# Patient Record
Sex: MALE | Race: BLACK OR AFRICAN AMERICAN | NOT HISPANIC OR LATINO | Employment: UNEMPLOYED | URBAN - METROPOLITAN AREA
[De-identification: names, ages, dates, MRNs, and addresses within clinical notes are randomized per-mention and may not be internally consistent; named-entity substitution may affect disease eponyms.]

---

## 2023-07-06 ENCOUNTER — HOSPITAL ENCOUNTER (INPATIENT)
Facility: HOSPITAL | Age: 68
LOS: 5 days | Discharge: HOME | DRG: 093 | End: 2023-07-11
Attending: EMERGENCY MEDICINE | Admitting: INTERNAL MEDICINE
Payer: MEDICARE

## 2023-07-06 ENCOUNTER — APPOINTMENT (EMERGENCY)
Dept: RADIOLOGY | Facility: HOSPITAL | Age: 68
DRG: 093 | End: 2023-07-06
Payer: MEDICARE

## 2023-07-06 ENCOUNTER — APPOINTMENT (EMERGENCY)
Dept: CT IMAGING | Facility: HOSPITAL | Age: 68
DRG: 093 | End: 2023-07-06
Attending: EMERGENCY MEDICINE
Payer: MEDICARE

## 2023-07-06 ENCOUNTER — APPOINTMENT (EMERGENCY)
Dept: CT IMAGING | Facility: HOSPITAL | Age: 68
DRG: 093 | End: 2023-07-06
Payer: MEDICARE

## 2023-07-06 DIAGNOSIS — G93.40 ENCEPHALOPATHY: ICD-10-CM

## 2023-07-06 DIAGNOSIS — I95.9 HYPOTENSIVE EPISODE: ICD-10-CM

## 2023-07-06 DIAGNOSIS — G93.40 ACUTE ENCEPHALOPATHY: ICD-10-CM

## 2023-07-06 DIAGNOSIS — R47.81 SLURRED SPEECH: Primary | ICD-10-CM

## 2023-07-06 DIAGNOSIS — F25.0 SCHIZOAFFECTIVE DISORDER, BIPOLAR TYPE (HCC): ICD-10-CM

## 2023-07-06 PROBLEM — E78.5 HYPERLIPIDEMIA: Status: ACTIVE | Noted: 2023-07-06

## 2023-07-06 PROBLEM — K21.9 GERD (GASTROESOPHAGEAL REFLUX DISEASE): Status: ACTIVE | Noted: 2023-07-06

## 2023-07-06 PROBLEM — F42.9 OBSESSIVE COMPULSIVE DISORDER: Status: ACTIVE | Noted: 2023-07-06

## 2023-07-06 PROBLEM — R56.9 SEIZURE (HCC): Status: ACTIVE | Noted: 2023-07-06

## 2023-07-06 PROBLEM — I10 HYPERTENSION: Status: ACTIVE | Noted: 2023-07-06

## 2023-07-06 LAB
2HR DELTA HS TROPONIN: -1 NG/L
ALBUMIN SERPL BCP-MCNC: 3.4 G/DL (ref 3.5–5)
ALP SERPL-CCNC: 44 U/L (ref 34–104)
ALT SERPL W P-5'-P-CCNC: 26 U/L (ref 7–52)
AMPHETAMINES SERPL QL SCN: NEGATIVE
ANION GAP SERPL CALCULATED.3IONS-SCNC: 7 MMOL/L
APAP SERPL-MCNC: <10 UG/ML (ref 10–20)
APTT PPP: 26 SECONDS (ref 23–37)
AST SERPL W P-5'-P-CCNC: 14 U/L (ref 13–39)
ATRIAL RATE: 70 BPM
BARBITURATES UR QL: NEGATIVE
BASE EXCESS BLDA CALC-SCNC: 2 MMOL/L (ref -2–3)
BASOPHILS # BLD AUTO: 0.02 THOUSANDS/ÂΜL (ref 0–0.1)
BASOPHILS NFR BLD AUTO: 0 % (ref 0–1)
BENZODIAZ UR QL: NEGATIVE
BILIRUB SERPL-MCNC: 0.46 MG/DL (ref 0.2–1)
BUN SERPL-MCNC: 24 MG/DL (ref 5–25)
CA-I BLD-SCNC: 1.14 MMOL/L (ref 1.12–1.32)
CALCIUM ALBUM COR SERPL-MCNC: 9 MG/DL (ref 8.3–10.1)
CALCIUM SERPL-MCNC: 8.5 MG/DL (ref 8.4–10.2)
CARDIAC TROPONIN I PNL SERPL HS: 4 NG/L
CARDIAC TROPONIN I PNL SERPL HS: 5 NG/L
CHLORIDE SERPL-SCNC: 104 MMOL/L (ref 96–108)
CO2 SERPL-SCNC: 26 MMOL/L (ref 21–32)
COCAINE UR QL: NEGATIVE
CREAT SERPL-MCNC: 1.15 MG/DL (ref 0.6–1.3)
EOSINOPHIL # BLD AUTO: 0.09 THOUSAND/ÂΜL (ref 0–0.61)
EOSINOPHIL NFR BLD AUTO: 1 % (ref 0–6)
ERYTHROCYTE [DISTWIDTH] IN BLOOD BY AUTOMATED COUNT: 13.5 % (ref 11.6–15.1)
ERYTHROCYTE [DISTWIDTH] IN BLOOD BY AUTOMATED COUNT: 13.7 % (ref 11.6–15.1)
ETHANOL SERPL-MCNC: <10 MG/DL
FLUAV RNA RESP QL NAA+PROBE: NEGATIVE
FLUBV RNA RESP QL NAA+PROBE: NEGATIVE
GFR SERPL CREATININE-BSD FRML MDRD: 64 ML/MIN/1.73SQ M
GLUCOSE SERPL-MCNC: 85 MG/DL (ref 65–140)
GLUCOSE SERPL-MCNC: 89 MG/DL (ref 65–140)
GLUCOSE SERPL-MCNC: 90 MG/DL (ref 65–140)
HCO3 BLDA-SCNC: 26.5 MMOL/L (ref 24–30)
HCT VFR BLD AUTO: 43.5 % (ref 36.5–49.3)
HCT VFR BLD AUTO: 43.7 % (ref 36.5–49.3)
HCT VFR BLD CALC: 40 % (ref 36.5–49.3)
HGB BLD-MCNC: 14.1 G/DL (ref 12–17)
HGB BLD-MCNC: 14.1 G/DL (ref 12–17)
HGB BLDA-MCNC: 13.6 G/DL (ref 12–17)
IMM GRANULOCYTES # BLD AUTO: 0.03 THOUSAND/UL (ref 0–0.2)
IMM GRANULOCYTES NFR BLD AUTO: 0 % (ref 0–2)
INR PPP: 0.94 (ref 0.84–1.19)
LACTATE SERPL-SCNC: 1.5 MMOL/L (ref 0.5–2)
LITHIUM SERPL-SCNC: 0.3 MMOL/L (ref 0.6–1.2)
LYMPHOCYTES # BLD AUTO: 0.9 THOUSANDS/ÂΜL (ref 0.6–4.47)
LYMPHOCYTES NFR BLD AUTO: 10 % (ref 14–44)
MCH RBC QN AUTO: 30.9 PG (ref 26.8–34.3)
MCH RBC QN AUTO: 31.3 PG (ref 26.8–34.3)
MCHC RBC AUTO-ENTMCNC: 32.3 G/DL (ref 31.4–37.4)
MCHC RBC AUTO-ENTMCNC: 32.4 G/DL (ref 31.4–37.4)
MCV RBC AUTO: 96 FL (ref 82–98)
MCV RBC AUTO: 97 FL (ref 82–98)
METHADONE UR QL: NEGATIVE
MONOCYTES # BLD AUTO: 0.69 THOUSAND/ÂΜL (ref 0.17–1.22)
MONOCYTES NFR BLD AUTO: 7 % (ref 4–12)
NEUTROPHILS # BLD AUTO: 7.61 THOUSANDS/ÂΜL (ref 1.85–7.62)
NEUTS SEG NFR BLD AUTO: 82 % (ref 43–75)
NRBC BLD AUTO-RTO: 0 /100 WBCS
OPIATES UR QL SCN: NEGATIVE
OXYCODONE+OXYMORPHONE UR QL SCN: NEGATIVE
P AXIS: 82 DEGREES
PCO2 BLD: 28 MMOL/L (ref 21–32)
PCO2 BLD: 42.2 MM HG (ref 42–50)
PCP UR QL: NEGATIVE
PH BLD: 7.41 [PH] (ref 7.3–7.4)
PLATELET # BLD AUTO: 164 THOUSANDS/UL (ref 149–390)
PLATELET # BLD AUTO: 195 THOUSANDS/UL (ref 149–390)
PMV BLD AUTO: 10 FL (ref 8.9–12.7)
PMV BLD AUTO: 9.5 FL (ref 8.9–12.7)
PO2 BLD: 62 MM HG (ref 35–45)
POTASSIUM BLD-SCNC: 3.7 MMOL/L (ref 3.5–5.3)
POTASSIUM SERPL-SCNC: 3.7 MMOL/L (ref 3.5–5.3)
PR INTERVAL: 164 MS
PROCALCITONIN SERPL-MCNC: 0.1 NG/ML
PROT SERPL-MCNC: 6.3 G/DL (ref 6.4–8.4)
PROTHROMBIN TIME: 13.2 SECONDS (ref 11.6–14.5)
QRS AXIS: 71 DEGREES
QRSD INTERVAL: 90 MS
QT INTERVAL: 390 MS
QTC INTERVAL: 421 MS
RBC # BLD AUTO: 4.5 MILLION/UL (ref 3.88–5.62)
RBC # BLD AUTO: 4.56 MILLION/UL (ref 3.88–5.62)
RSV RNA RESP QL NAA+PROBE: NEGATIVE
SALICYLATES SERPL-MCNC: <5 MG/DL (ref 3–20)
SAO2 % BLD FROM PO2: 91 % (ref 60–85)
SARS-COV-2 RNA RESP QL NAA+PROBE: NEGATIVE
SODIUM BLD-SCNC: 138 MMOL/L (ref 136–145)
SODIUM SERPL-SCNC: 137 MMOL/L (ref 135–147)
SPECIMEN SOURCE: ABNORMAL
T WAVE AXIS: 56 DEGREES
THC UR QL: NEGATIVE
TSH SERPL DL<=0.05 MIU/L-ACNC: 0.41 UIU/ML (ref 0.45–4.5)
VENTRICULAR RATE: 70 BPM
WBC # BLD AUTO: 10.08 THOUSAND/UL (ref 4.31–10.16)
WBC # BLD AUTO: 9.34 THOUSAND/UL (ref 4.31–10.16)

## 2023-07-06 PROCEDURE — 85027 COMPLETE CBC AUTOMATED: CPT | Performed by: EMERGENCY MEDICINE

## 2023-07-06 PROCEDURE — 82948 REAGENT STRIP/BLOOD GLUCOSE: CPT

## 2023-07-06 PROCEDURE — 82330 ASSAY OF CALCIUM: CPT

## 2023-07-06 PROCEDURE — 85014 HEMATOCRIT: CPT

## 2023-07-06 PROCEDURE — 84132 ASSAY OF SERUM POTASSIUM: CPT

## 2023-07-06 PROCEDURE — 96361 HYDRATE IV INFUSION ADD-ON: CPT

## 2023-07-06 PROCEDURE — 80178 ASSAY OF LITHIUM: CPT | Performed by: EMERGENCY MEDICINE

## 2023-07-06 PROCEDURE — 82803 BLOOD GASES ANY COMBINATION: CPT

## 2023-07-06 PROCEDURE — 84484 ASSAY OF TROPONIN QUANT: CPT | Performed by: EMERGENCY MEDICINE

## 2023-07-06 PROCEDURE — 80307 DRUG TEST PRSMV CHEM ANLYZR: CPT | Performed by: EMERGENCY MEDICINE

## 2023-07-06 PROCEDURE — 80143 DRUG ASSAY ACETAMINOPHEN: CPT | Performed by: EMERGENCY MEDICINE

## 2023-07-06 PROCEDURE — 96372 THER/PROPH/DIAG INJ SC/IM: CPT

## 2023-07-06 PROCEDURE — 93010 ELECTROCARDIOGRAM REPORT: CPT | Performed by: INTERNAL MEDICINE

## 2023-07-06 PROCEDURE — 84145 PROCALCITONIN (PCT): CPT | Performed by: EMERGENCY MEDICINE

## 2023-07-06 PROCEDURE — 85730 THROMBOPLASTIN TIME PARTIAL: CPT | Performed by: EMERGENCY MEDICINE

## 2023-07-06 PROCEDURE — 36415 COLL VENOUS BLD VENIPUNCTURE: CPT | Performed by: EMERGENCY MEDICINE

## 2023-07-06 PROCEDURE — 82947 ASSAY GLUCOSE BLOOD QUANT: CPT

## 2023-07-06 PROCEDURE — 0241U HB NFCT DS VIR RESP RNA 4 TRGT: CPT | Performed by: EMERGENCY MEDICINE

## 2023-07-06 PROCEDURE — 93005 ELECTROCARDIOGRAM TRACING: CPT

## 2023-07-06 PROCEDURE — 99291 CRITICAL CARE FIRST HOUR: CPT | Performed by: EMERGENCY MEDICINE

## 2023-07-06 PROCEDURE — 99285 EMERGENCY DEPT VISIT HI MDM: CPT

## 2023-07-06 PROCEDURE — 82607 VITAMIN B-12: CPT | Performed by: PHYSICIAN ASSISTANT

## 2023-07-06 PROCEDURE — 70496 CT ANGIOGRAPHY HEAD: CPT

## 2023-07-06 PROCEDURE — 71045 X-RAY EXAM CHEST 1 VIEW: CPT

## 2023-07-06 PROCEDURE — 80179 DRUG ASSAY SALICYLATE: CPT | Performed by: EMERGENCY MEDICINE

## 2023-07-06 PROCEDURE — 85610 PROTHROMBIN TIME: CPT | Performed by: EMERGENCY MEDICINE

## 2023-07-06 PROCEDURE — 84439 ASSAY OF FREE THYROXINE: CPT | Performed by: PHYSICIAN ASSISTANT

## 2023-07-06 PROCEDURE — 82077 ASSAY SPEC XCP UR&BREATH IA: CPT | Performed by: EMERGENCY MEDICINE

## 2023-07-06 PROCEDURE — 99223 1ST HOSP IP/OBS HIGH 75: CPT | Performed by: INTERNAL MEDICINE

## 2023-07-06 PROCEDURE — 80053 COMPREHEN METABOLIC PANEL: CPT | Performed by: EMERGENCY MEDICINE

## 2023-07-06 PROCEDURE — 87040 BLOOD CULTURE FOR BACTERIA: CPT | Performed by: EMERGENCY MEDICINE

## 2023-07-06 PROCEDURE — 96375 TX/PRO/DX INJ NEW DRUG ADDON: CPT

## 2023-07-06 PROCEDURE — 85025 COMPLETE CBC W/AUTO DIFF WBC: CPT | Performed by: PHYSICIAN ASSISTANT

## 2023-07-06 PROCEDURE — 83605 ASSAY OF LACTIC ACID: CPT | Performed by: EMERGENCY MEDICINE

## 2023-07-06 PROCEDURE — 84295 ASSAY OF SERUM SODIUM: CPT

## 2023-07-06 PROCEDURE — 96365 THER/PROPH/DIAG IV INF INIT: CPT

## 2023-07-06 PROCEDURE — 84443 ASSAY THYROID STIM HORMONE: CPT | Performed by: PHYSICIAN ASSISTANT

## 2023-07-06 PROCEDURE — 70498 CT ANGIOGRAPHY NECK: CPT

## 2023-07-06 RX ORDER — CHLORTHALIDONE 25 MG/1
25 TABLET ORAL DAILY
COMMUNITY

## 2023-07-06 RX ORDER — BENZTROPINE MESYLATE 1 MG/1
1 TABLET ORAL DAILY
Status: DISCONTINUED | OUTPATIENT
Start: 2023-07-07 | End: 2023-07-07

## 2023-07-06 RX ORDER — SODIUM CHLORIDE 9 MG/ML
75 INJECTION, SOLUTION INTRAVENOUS CONTINUOUS
Status: DISCONTINUED | OUTPATIENT
Start: 2023-07-06 | End: 2023-07-09

## 2023-07-06 RX ORDER — ATORVASTATIN CALCIUM 40 MG/1
40 TABLET, FILM COATED ORAL
Status: DISCONTINUED | OUTPATIENT
Start: 2023-07-06 | End: 2023-07-11 | Stop reason: HOSPADM

## 2023-07-06 RX ORDER — NALOXONE HYDROCHLORIDE 1 MG/ML
1 INJECTION PARENTERAL ONCE
Status: COMPLETED | OUTPATIENT
Start: 2023-07-06 | End: 2023-07-06

## 2023-07-06 RX ORDER — LITHIUM CARBONATE 300 MG/1
900 TABLET, FILM COATED, EXTENDED RELEASE ORAL
Status: DISCONTINUED | OUTPATIENT
Start: 2023-07-06 | End: 2023-07-11 | Stop reason: HOSPADM

## 2023-07-06 RX ORDER — CLOZAPINE 200 MG/1
200 TABLET ORAL
COMMUNITY
End: 2023-07-11

## 2023-07-06 RX ORDER — HYDRALAZINE HYDROCHLORIDE 20 MG/ML
5 INJECTION INTRAMUSCULAR; INTRAVENOUS EVERY 6 HOURS PRN
Status: DISCONTINUED | OUTPATIENT
Start: 2023-07-06 | End: 2023-07-11 | Stop reason: HOSPADM

## 2023-07-06 RX ORDER — SODIUM CHLORIDE, SODIUM GLUCONATE, SODIUM ACETATE, POTASSIUM CHLORIDE, MAGNESIUM CHLORIDE, SODIUM PHOSPHATE, DIBASIC, AND POTASSIUM PHOSPHATE .53; .5; .37; .037; .03; .012; .00082 G/100ML; G/100ML; G/100ML; G/100ML; G/100ML; G/100ML; G/100ML
1000 INJECTION, SOLUTION INTRAVENOUS ONCE
Status: COMPLETED | OUTPATIENT
Start: 2023-07-06 | End: 2023-07-06

## 2023-07-06 RX ORDER — CLOZAPINE 100 MG/1
200 TABLET ORAL
Status: DISCONTINUED | OUTPATIENT
Start: 2023-07-06 | End: 2023-07-07

## 2023-07-06 RX ORDER — ARIPIPRAZOLE 400 MG
400 KIT INTRAMUSCULAR
COMMUNITY
End: 2023-07-11

## 2023-07-06 RX ORDER — HALOPERIDOL 5 MG/ML
INJECTION INTRAMUSCULAR
Status: COMPLETED
Start: 2023-07-06 | End: 2023-07-06

## 2023-07-06 RX ORDER — LORAZEPAM 2 MG/ML
1 INJECTION INTRAMUSCULAR ONCE
Status: COMPLETED | OUTPATIENT
Start: 2023-07-06 | End: 2023-07-06

## 2023-07-06 RX ORDER — FLUVOXAMINE MALEATE 100 MG
300 TABLET ORAL
COMMUNITY
End: 2023-07-11

## 2023-07-06 RX ORDER — BENZTROPINE MESYLATE 1 MG/1
1 TABLET ORAL EVERY 8 HOURS PRN
Status: DISCONTINUED | OUTPATIENT
Start: 2023-07-06 | End: 2023-07-11 | Stop reason: HOSPADM

## 2023-07-06 RX ORDER — CARVEDILOL 12.5 MG/1
25 TABLET ORAL 2 TIMES DAILY WITH MEALS
Status: DISCONTINUED | OUTPATIENT
Start: 2023-07-06 | End: 2023-07-11 | Stop reason: HOSPADM

## 2023-07-06 RX ORDER — CITALOPRAM 20 MG/1
20 TABLET ORAL DAILY
Status: DISCONTINUED | OUTPATIENT
Start: 2023-07-07 | End: 2023-07-07

## 2023-07-06 RX ORDER — DOXAZOSIN MESYLATE 1 MG/1
1 TABLET ORAL
COMMUNITY

## 2023-07-06 RX ORDER — DOXEPIN HYDROCHLORIDE 100 MG/1
100 CAPSULE ORAL
COMMUNITY
End: 2023-07-11

## 2023-07-06 RX ORDER — DOXEPIN HYDROCHLORIDE 50 MG/1
100 CAPSULE ORAL
Status: DISCONTINUED | OUTPATIENT
Start: 2023-07-06 | End: 2023-07-07

## 2023-07-06 RX ORDER — PANTOPRAZOLE SODIUM 40 MG/1
40 TABLET, DELAYED RELEASE ORAL
Status: DISCONTINUED | OUTPATIENT
Start: 2023-07-07 | End: 2023-07-11 | Stop reason: HOSPADM

## 2023-07-06 RX ORDER — AMLODIPINE BESYLATE 5 MG/1
10 TABLET ORAL DAILY
Status: DISCONTINUED | OUTPATIENT
Start: 2023-07-07 | End: 2023-07-08

## 2023-07-06 RX ORDER — OLANZAPINE 20 MG/1
20 TABLET ORAL 2 TIMES DAILY
COMMUNITY

## 2023-07-06 RX ORDER — CHLORTHALIDONE 25 MG/1
25 TABLET ORAL DAILY
Status: DISCONTINUED | OUTPATIENT
Start: 2023-07-07 | End: 2023-07-08

## 2023-07-06 RX ORDER — ENOXAPARIN SODIUM 100 MG/ML
40 INJECTION SUBCUTANEOUS
Status: DISCONTINUED | OUTPATIENT
Start: 2023-07-07 | End: 2023-07-11 | Stop reason: HOSPADM

## 2023-07-06 RX ORDER — FLUVOXAMINE MALEATE 100 MG
300 TABLET ORAL
Status: DISCONTINUED | OUTPATIENT
Start: 2023-07-06 | End: 2023-07-07

## 2023-07-06 RX ORDER — ROSUVASTATIN CALCIUM 20 MG/1
20 TABLET, COATED ORAL DAILY
COMMUNITY

## 2023-07-06 RX ORDER — LITHIUM CARBONATE 300 MG
900 TABLET ORAL
COMMUNITY

## 2023-07-06 RX ORDER — CITALOPRAM 20 MG/1
20 TABLET ORAL DAILY
COMMUNITY
End: 2023-07-11

## 2023-07-06 RX ORDER — OLANZAPINE 10 MG/1
20 TABLET ORAL 2 TIMES DAILY
Status: DISCONTINUED | OUTPATIENT
Start: 2023-07-06 | End: 2023-07-11 | Stop reason: HOSPADM

## 2023-07-06 RX ORDER — AMLODIPINE BESYLATE 10 MG/1
10 TABLET ORAL DAILY
COMMUNITY

## 2023-07-06 RX ORDER — LORAZEPAM 1 MG/1
1 TABLET ORAL
COMMUNITY

## 2023-07-06 RX ORDER — OMEPRAZOLE 20 MG/1
20 CAPSULE, DELAYED RELEASE ORAL DAILY
COMMUNITY

## 2023-07-06 RX ORDER — BENZTROPINE MESYLATE 1 MG/1
1 TABLET ORAL EVERY 8 HOURS PRN
COMMUNITY

## 2023-07-06 RX ORDER — HALOPERIDOL 5 MG/ML
10 INJECTION INTRAMUSCULAR ONCE
Status: COMPLETED | OUTPATIENT
Start: 2023-07-06 | End: 2023-07-06

## 2023-07-06 RX ORDER — LISINOPRIL 20 MG/1
40 TABLET ORAL DAILY
Status: DISCONTINUED | OUTPATIENT
Start: 2023-07-07 | End: 2023-07-11 | Stop reason: HOSPADM

## 2023-07-06 RX ORDER — CARVEDILOL 25 MG/1
25 TABLET ORAL 2 TIMES DAILY WITH MEALS
COMMUNITY

## 2023-07-06 RX ORDER — LISINOPRIL 40 MG/1
40 TABLET ORAL DAILY
COMMUNITY

## 2023-07-06 RX ORDER — BENZTROPINE MESYLATE 1 MG/1
1 TABLET ORAL DAILY
COMMUNITY
End: 2023-07-11

## 2023-07-06 RX ORDER — DOXAZOSIN MESYLATE 1 MG/1
1 TABLET ORAL
Status: DISCONTINUED | OUTPATIENT
Start: 2023-07-06 | End: 2023-07-11 | Stop reason: HOSPADM

## 2023-07-06 RX ADMIN — SODIUM CHLORIDE 125 ML/HR: 0.9 INJECTION, SOLUTION INTRAVENOUS at 15:46

## 2023-07-06 RX ADMIN — LORAZEPAM 1 MG: 2 INJECTION INTRAMUSCULAR; INTRAVENOUS at 14:05

## 2023-07-06 RX ADMIN — SODIUM CHLORIDE, SODIUM GLUCONATE, SODIUM ACETATE, POTASSIUM CHLORIDE, MAGNESIUM CHLORIDE, SODIUM PHOSPHATE, DIBASIC, AND POTASSIUM PHOSPHATE 1000 ML: .53; .5; .37; .037; .03; .012; .00082 INJECTION, SOLUTION INTRAVENOUS at 14:29

## 2023-07-06 RX ADMIN — HALOPERIDOL LACTATE 10 MG: 5 INJECTION, SOLUTION INTRAMUSCULAR at 14:06

## 2023-07-06 RX ADMIN — HALOPERIDOL 10 MG: 5 INJECTION INTRAMUSCULAR at 14:06

## 2023-07-06 RX ADMIN — LORAZEPAM 1 MG: 2 INJECTION INTRAMUSCULAR; INTRAVENOUS at 14:06

## 2023-07-06 NOTE — CONSULTS
Consultation - Stroke   Danielle Centeno 77 y.o. male MRN: [id number]  Unit/Bed#: TR13B Encounter: [id number]    Assessment/Plan    77 year with medical hx of of schizoaffective disorder, bipolar, PTSD, OCD, HTN, hyperlipidemia, and prediabetes. It was reported the patient to be dysarthric and encephalopathic at 9 a.m., last unknown well unclear. The patient was encephalopathic per EMS and narcan was given. Initially in the CT scanner, the patient was encephalopathic, however, after a few minutes,  he developed a hyperactive delirium. NIH was noted to be a 6, with no gross focal findings noted (thought limited with agitation). The patient became agitated and combative, he was placed in lock restraints, IV haldol and benzos, control team was called. The patient was unable to have a CT scan of his head, CTA of the head and neck secondary to safety issues. We believe presenting symptoms are less likely stroke, he is also out of the time window for TNK, no evidence to suspect LVO. It was reported he was hypotensive on arrival with blood pressures in the 60s. · Suspect, presentation is less likely stroke as examination findings are non focal, this may be related to a toxic metabolic/infectious derangement, rule out acute intoxication, medication related, vs secondary to his underlying psychiatric hx with a hyperactive delirium. No evidence to suspect seizure. This may be related to cerebral hypoperfusion as his blood pressures have been on the lower side with readings in the sixties prior to arrival.     - CT imaging of head when able/safe. CT of head was delayed, but this showed no acute intracranial abnormality.   CTA with no lvo or significant stenosis.    -No need for MRI at this time, only consider MRI if he does not return to baseline, upon awakening.   -The patient is being treated for hyperactive delirium, agitation -   control team was activated the patient was given lorazepam, Haldol, and Narcan in the ED  -Continue to monitor for infectious metabolic derangement, would check a urine drug screen, etoh level, b12, TSH as well as work-up for infectious/metabolic etiologies  -Avoid hypotension for cerebral perfusion.   -Patient will eventually need a psychiatric consult  -Unless CT imaging abnormality or new focality seen on examination, no further neurological recommendations  -Please see attestation by attending neurologist, plan of care per attending neurologist.     History of Present Illness     Reason for Consult / Principal Problem: Stroke like symptoms. Hx and PE limited by: Acutely encephalopathic  Patient last known well: Symptoms noted at 9 a.m., unknown last known well. Stroke alert called: 1:18 p.m. Neurology time of arrival: 1:19 p.m. HPI: Tc Li is a 77 y.o.  with medical hx of schizoaffective disorder, bipolar, PTSD, OCD, HTN, hyperlipidemia, and prediabetes. It is reported by the ED attending, at 9 a.m. the facility he resides at noticed slurred/garbled speech, he was also noted also to be encephalopathic. EMS was notified and he was noted to be lethargic and encephalopathic. He was given Narcan by EMS. In the ED, the patient had garbled speech. The patient initially was encephalopathic on arrival, however, after a few minutes he awoken and became agitated, he was noted to have an hyperactive delirium and was agitated. The patient was not cooperative with examination, CT/CTA was delayed/held secondary to the patient not being focal and agitated, he would not cooperate. The patient became even more agitated in the ED. Requiring, ativan and IM haldol as well as restraints. NIH was a 6, when able to perform but this was limited. A control team was eventually called in the ED, as the patient was agitated and aggressive, as he was attempting to strike the healthcare providers, he neeed IV haldol, ativan and restraints.      The patient is non focal, he was observed moving all limbs antigravity, by attending. No facial droop noted, eyes were midline, he did blink to threat. No seizure activity seen. Addendum -caregiver came to the room it is reported that he was normal yesterday and first thing this morning he was resisting staff as he was getting ready for an eye doctor appointment he had a pretty sudden change in his speech which became garbled and mostly unintelligible he was unable to and ambulate without significant assistance. It was reported that the blood pressure was 60/40 at the facility and at the EMTs blood pressure was 70 systolic. His blood pressures on in ED was 94/51 mmhg. Inpatient consult to Neurology  Consult performed by: Alicja Farris PA-C  Consult ordered by: Bon Sparks DO          Review of Systems  Limited secondary to encephalopathy    Historical Information   No past medical history on file. No past surgical history on file. Social History   Social History     Substance and Sexual Activity   Alcohol Use Not on file     Social History     Substance and Sexual Activity   Drug Use Not on file     No existing history information found. No existing history information found. Social History     Tobacco Use   Smoking Status Not on file   Smokeless Tobacco Not on file     Family History: No family history on file. Meds/Allergies   all current active meds have been reviewed, current meds:   Current Facility-Administered Medications   Medication Dose Route Frequency   • LORazepam (ATIVAN) injection 1 mg  1 mg Intravenous Once   • naloxone (FOR EMS ONLY) (NARCAN) 2 MG/2ML injection 2 mg  1 each Does not apply Once    and PTA meds:   None       Not on File    Objective   Vitals:Blood pressure 109/58, resp. rate 16, SpO2 99 %. ,There is no height or weight on file to calculate BMI. No intake or output data in the 24 hours ending 07/06/23 1332    Invasive Devices:    Invasive Devices     Peripheral Intravenous Line  Duration Peripheral IV 23 Left Antecubital <1 day              NIHSS:  1a.Level of Consciousness: 2 = Not alert, requires repeated stimulation to attend   1b. LOC Questions: 1 = Answers one correctly   1c. LOC Commands: 1 = Obeys one correctly   2. Best Gaze: 0 = Normal   3. Visual: 0 = No visual field loss   4. Facial Palsy: 0=Normal symmetric movement   5a. Motor Right Arm: 0=No drift, limb holds 90 (or 45) degrees for full 10 seconds   5b. Motor Left Arm: 0=No drift, limb holds 90 (or 45) degrees for full 10 seconds   6a. Motor Right Le=No drift, limb holds 90 (or 45) degrees for full 10 seconds   6b. Motor Left Le=No drift, limb holds 90 (or 45) degrees for full 10 seconds   7. Limb Ataxia:  0=Absent   8. Sensory: 0=Normal; no sensory loss   9. Best Language:  0=No aphasia, normal   10. Dysarthria: 2=Severe; patient speech is so slurred as to be unintelligible in the absence of or our of proportion to any dysphagia, or is mute/anarthric   11. Extinction and Inattention (formerly Neglect): 0=No abnormality   Total Score: 6 (limited secondary to patient being combative)     The patient became agitated with examiner, he was observed, moving all extremities off bed, he had no facial droop noted, eyes midline, blinked to threat, he was able to tell us name and , he was able to intermittently follow commands. A few minutes after initial encounter the patient became combative, moving all extremities in the uppers and lowers symmetrically. (examined alongside attending physician). Time NIHSS was completed: at time of stroke alert. Lab Results: I have personally reviewed pertinent reports. Imaging Studies: I have personally reviewed pertinent reports. EKG, Pathology, and Other Studies: I have personally reviewed pertinent reports.    and I have personally reviewed pertinent films in PACS    Code Status: No Order    Reviewed case with neurology attending, history and physical examination, labs and imaging completed, plan of care as per attending physician. Please see attestation for further details. Examined alongside attending physician. Plan of care per attending.

## 2023-07-06 NOTE — ED PROVIDER NOTES
History  Chief Complaint   Patient presents with   • STROKE Alert     Stroke alert     History from paramedics and caretaker at [facility]. Past medical history OCD schizoaffective blind in the left eye normally has garbled speech but caretaker noted sudden change in behavior around 9 AM this morning his speech got more garbled and paramedics noted his blood pressure was low systolic in the 13B. They gave Narcan 2 mg he became more alert but still unintelligible. Prior to Admission Medications   Prescriptions Last Dose Informant Patient Reported? Taking?    ARIPiprazole ER (Abilify Maintena) 400 MG SRER 7/5/2023  Yes Yes   Sig: Inject 400 mg into a muscle every 30 (thirty) days   Diclofenac Sodium (VOLTAREN) 1 % 7/5/2023  Yes Yes   Sig: Apply 2 g topically 4 (four) times a day   LORazepam (ATIVAN) 1 mg tablet 7/5/2023  Yes Yes   Sig: Take 1 mg by mouth daily at bedtime   OLANZapine (ZyPREXA) 20 MG tablet 7/5/2023  Yes Yes   Sig: Take 20 mg by mouth 2 (two) times a day   amLODIPine (NORVASC) 10 mg tablet 7/5/2023  Yes Yes   Sig: Take 10 mg by mouth daily   benztropine (COGENTIN) 1 mg tablet 7/5/2023  Yes Yes   Sig: Take 1 mg by mouth daily   benztropine (COGENTIN) 1 mg tablet 7/5/2023  Yes Yes   Sig: Take 1 mg by mouth every 8 (eight) hours as needed for tremors   carvedilol (COREG) 25 mg tablet 7/5/2023  Yes Yes   Sig: Take 25 mg by mouth 2 (two) times a day with meals   chlorthalidone 25 mg tablet 7/5/2023  Yes Yes   Sig: Take 25 mg by mouth daily   citalopram (CeleXA) 20 mg tablet 7/5/2023  Yes Yes   Sig: Take 20 mg by mouth daily   clozapine (CLOZARIL) 200 MG tablet 7/5/2023  Yes Yes   Sig: Take 200 mg by mouth daily at bedtime   doxazosin (CARDURA) 1 mg tablet 7/5/2023  Yes Yes   Sig: Take 1 mg by mouth daily at bedtime   doxepin (SINEquan) 100 mg capsule 7/5/2023  Yes Yes   Sig: Take 100 mg by mouth daily at bedtime   fluvoxaMINE (LUVOX) 100 mg tablet 7/5/2023  Yes Yes   Sig: Take 300 mg by mouth daily at bedtime   lisinopril (ZESTRIL) 40 mg tablet 7/5/2023  Yes Yes   Sig: Take 40 mg by mouth daily   lithium 300 MG tablet 7/5/2023  Yes Yes   Sig: Take 900 mg by mouth daily at bedtime   omeprazole (PriLOSEC) 20 mg delayed release capsule 7/5/2023  Yes Yes   Sig: Take 20 mg by mouth daily   rosuvastatin (CRESTOR) 20 MG tablet 7/5/2023  Yes Yes   Sig: Take 20 mg by mouth daily      Facility-Administered Medications: None       Past Medical History:   Diagnosis Date   • Obsessive compulsive disorder    • Schizoaffective disorder, bipolar type ([address])        History reviewed. No pertinent surgical history. Family History   Family history unknown: Yes     I have reviewed and agree with the history as documented. E-Cigarette/Vaping     E-Cigarette/Vaping Substances     Social History     Tobacco Use   • Smoking status: Unknown       Review of Systems   Unable to perform ROS: Mental status change   All other systems reviewed and are negative. Physical Exam  Physical Exam  Vitals and nursing note reviewed. Constitutional:       Appearance: He is well-developed. Comments: Initially unresponsive then started yelling, incomprehensible and swinging at staff. HENT:      Head: Normocephalic and atraumatic. Right Ear: External ear normal.      Left Ear: External ear normal.      Nose: Nose normal.   Eyes:      Conjunctiva/sclera: Conjunctivae normal.      Comments: Pupils irregular, large opacity probable cataract left eye,    No cooperative for visual acuity exam   Cardiovascular:      Rate and Rhythm: Normal rate and regular rhythm. Heart sounds: Normal heart sounds. Pulmonary:      Effort: Pulmonary effort is normal. No respiratory distress. Breath sounds: Normal breath sounds. No wheezing. Abdominal:      General: Bowel sounds are normal. There is no distension. Palpations: Abdomen is soft. Tenderness: There is no abdominal tenderness.    Musculoskeletal:         General: No deformity. Normal range of motion. Cervical back: Normal range of motion and neck supple. No spinous process tenderness. Skin:     General: Skin is warm and dry. Findings: No rash. Neurological:      Mental Status: He is disoriented and confused. GCS: GCS eye subscore is 3. GCS verbal subscore is 2. GCS motor subscore is 5. Cranial Nerves: Facial asymmetry present. Sensory: No sensory deficit. Comments: Possible left face droop but exam inconsistent, moving right arm and bilateral legs spontaneously   Psychiatric:         Attention and Perception: He is inattentive. Mood and Affect: Affect is labile and angry. Speech: He is noncommunicative. Behavior: Behavior is uncooperative and aggressive. Cognition and Memory: Cognition is impaired. Memory is impaired. Judgment: Judgment is impulsive and inappropriate.          Vital Signs  ED Triage Vitals   Temperature Pulse Respirations Blood Pressure SpO2   07/06/23 1400 07/06/23 1336 07/06/23 1319 07/06/23 1319 07/06/23 1319   98.3 °F (36.8 °C) 74 16 109/58 99 %      Temp Source Heart Rate Source Patient Position - Orthostatic VS BP Location FiO2 (%)   07/06/23 1400 07/06/23 1336 07/06/23 1319 07/06/23 1336 --   Oral Monitor Lying Left arm       Pain Score       --                  Vitals:    07/06/23 1615 07/06/23 1645 07/06/23 1715 07/06/23 1913   BP: 117/65 109/61 110/62 110/68   Pulse: 66 67 67 68   Patient Position - Orthostatic VS: Lying  Lying          Visual Acuity  Visual Acuity    Flowsheet Row Most Recent Value   L Pupil Size (mm) 2   R Pupil Size (mm) 2   L Pupil Shape Round   R Pupil Shape Round          ED Medications  Medications   iohexol (OMNIPAQUE) 350 MG/ML injection (SINGLE-DOSE) 85 mL (has no administration in time range)   sodium chloride 0.9 % infusion (75 mL/hr Intravenous Rate/Dose Change 7/6/23 5319)   amLODIPine (NORVASC) tablet 10 mg (has no administration in time range) benztropine (COGENTIN) tablet 1 mg (has no administration in time range)   benztropine (COGENTIN) tablet 1 mg (has no administration in time range)   carvedilol (COREG) tablet 25 mg (25 mg Oral Not Given 7/6/23 1821)   chlorthalidone tablet 25 mg (has no administration in time range)   citalopram (CeleXA) tablet 20 mg (has no administration in time range)   cloZAPine (CLOZARIL) tablet 200 mg (has no administration in time range)   doxazosin (CARDURA) tablet 1 mg (has no administration in time range)   doxepin (SINEquan) capsule 100 mg (has no administration in time range)   fluvoxaMINE (LUVOX) tablet 300 mg (has no administration in time range)   lisinopril (ZESTRIL) tablet 40 mg (has no administration in time range)   lithium carbonate (LITHOBID) CR tablet 900 mg (has no administration in time range)   OLANZapine (ZyPREXA) tablet 20 mg (20 mg Oral Not Given 7/6/23 1821)   pantoprazole (PROTONIX) EC tablet 40 mg (has no administration in time range)   atorvastatin (LIPITOR) tablet 40 mg (40 mg Oral Not Given 7/6/23 1821)   hydrALAZINE (APRESOLINE) injection 5 mg (has no administration in time range)   enoxaparin (LOVENOX) subcutaneous injection 40 mg (has no administration in time range)   pneumococcal 20-rohini conj vacc (PREVNAR 20) IM Injection 0.5 mL (has no administration in time range)   naloxone (FOR EMS ONLY) (NARCAN) 2 MG/2ML injection 2 mg (0 mg Does not apply Given to EMS 7/6/23 1405)   LORazepam (ATIVAN) injection 1 mg (1 mg Intravenous Given 7/6/23 1405)   haloperidol lactate (HALDOL) injection 10 mg (10 mg Intramuscular Given 7/6/23 1406)   LORazepam (ATIVAN) injection 1 mg (1 mg Intravenous Given 7/6/23 1406)   multi-electrolyte (PLASMALYTE-A/ISOLYTE-S PH 7.4) IV solution 1,000 mL (0 mL Intravenous Stopped 7/6/23 1544)       Diagnostic Studies  Results Reviewed     Procedure Component Value Units Date/Time    Vitamin B12 [[id number]] Collected: 07/06/23 1408    Lab Status:  In process Specimen: Blood from Arm, Left Updated: 07/06/23 2135    T4, free [[id number]] Collected: 07/06/23 1408    Lab Status: In process Specimen: Blood from Arm, Left Updated: 07/06/23 2135    TSH, 3rd generation [[id number]]  (Abnormal) Collected: 07/06/23 1408    Lab Status: Final result Specimen: Blood from Arm, Left Updated: 07/06/23 1905     TSH 3RD GENERATON 0.413 uIU/mL     Lithium level [[id number]]  (Abnormal) Collected: 07/06/23 1408    Lab Status: Final result Specimen: Blood from Arm, Right Updated: 07/06/23 1634     Lithium Lvl 0.3 mmol/L     Comprehensive metabolic panel [[id number]]  (Abnormal) Collected: 07/06/23 1408    Lab Status: Final result Specimen: Blood from Arm, Left Updated: 07/06/23 1526     Sodium 137 mmol/L      Potassium 3.7 mmol/L      Chloride 104 mmol/L      CO2 26 mmol/L      ANION GAP 7 mmol/L      BUN 24 mg/dL      Creatinine 1.15 mg/dL      Glucose 85 mg/dL      Calcium 8.5 mg/dL      Corrected Calcium 9.0 mg/dL      AST 14 U/L      ALT 26 U/L      Alkaline Phosphatase 44 U/L      Total Protein 6.3 g/dL      Albumin 3.4 g/dL      Total Bilirubin 0.46 mg/dL      eGFR 64 ml/min/1.73sq m     Narrative:      [facility] guidelines for Chronic Kidney Disease (CKD):   •  Stage 1 with normal or high GFR (GFR > 90 mL/min/1.73 square meters)  •  Stage 2 Mild CKD (GFR = 60-89 mL/min/1.73 square meters)  •  Stage 3A Moderate CKD (GFR = 45-59 mL/min/1.73 square meters)  •  Stage 3B Moderate CKD (GFR = 30-44 mL/min/1.73 square meters)  •  Stage 4 Severe CKD (GFR = 15-29 mL/min/1.73 square meters)  •  Stage 5 End Stage CKD (GFR <15 mL/min/1.73 square meters)  Note: GFR calculation is accurate only with a steady state creatinine    HS Troponin I 4hr [[id number]]     Lab Status: No result Specimen: Blood     Acetaminophen level-If concentration is detectable, please discuss with medical  on call.  [[id number]]  (Abnormal) Collected: 07/06/23 9027    Lab Status: Final result Specimen: Blood from Arm, Right Updated: 07/06/23 1453     Acetaminophen Level <08 ug/mL     Salicylate level [[id number]]  (Normal) Collected: 07/06/23 1408    Lab Status: Final result Specimen: Blood from Arm, Right Updated: 96/29/33 6145     Salicylate Lvl <5 mg/dL     HS Troponin I 2hr [[id number]]  (Normal) Collected: 07/06/23 1400    Lab Status: Final result Specimen: Blood Updated: 07/06/23 1452     hs TnI 2hr 4 ng/L      Delta 2hr hsTnI -1 ng/L     Procalcitonin [[id number]]  (Normal) Collected: 07/06/23 1408    Lab Status: Final result Specimen: Blood from Arm, Left Updated: 07/06/23 1448     Procalcitonin 0.10 ng/ml     Ethanol [[id number]]  (Normal) Collected: 07/06/23 1408    Lab Status: Final result Specimen: Blood from Arm, Right Updated: 07/06/23 1439     Ethanol Lvl <10 mg/dL     Lactic acid, plasma (w/reflex if result > 2.0) [[id number]]  (Normal) Collected: 07/06/23 1408    Lab Status: Final result Specimen: Blood from Arm, Left Updated: 07/06/23 1439     LACTIC ACID 1.5 mmol/L     Narrative:      Result may be elevated if tourniquet was used during collection. Rapid drug screen, urine [[id number]]  (Normal) Collected: 07/06/23 1411    Lab Status: Final result Specimen: Urine, Catheter Updated: 07/06/23 1435     Amph/Meth UR Negative     Barbiturate Ur Negative     Benzodiazepine Urine Negative     Cocaine Urine Negative     Methadone Urine Negative     Opiate Urine Negative     PCP Ur Negative     THC Urine Negative     Oxycodone Urine Negative    Narrative:      FOR MEDICAL PURPOSES ONLY. IF CONFIRMATION NEEDED PLEASE CONTACT THE LAB WITHIN 5 DAYS.     Drug Screen Cutoff Levels:  AMPHETAMINE/METHAMPHETAMINES  1000 ng/mL  BARBITURATES     200 ng/mL  BENZODIAZEPINES     200 ng/mL  COCAINE      300 ng/mL  METHADONE      300 ng/mL  OPIATES      300 ng/mL  PHENCYCLIDINE     25 ng/mL  THC       50 ng/mL  OXYCODONE      100 ng/mL    POCT Blood Gas (CG8+) [[id number]]  (Abnormal) Collected: 07/06/23 1418    Lab Status: Final result Specimen: Venous Updated: 07/06/23 1422     ph, Nicholas ISTAT 7.406     pCO2, Nicholas i-STAT 42.2 mm HG      pO2, Nicholas i-STAT 62.0 mm HG      BE, i-STAT 2 mmol/L      HCO3, Nicholas i-STAT 26.5 mmol/L      CO2, i-STAT 28 mmol/L      O2 Sat, i-STAT 91 %      SODIUM, I-STAT 138 mmol/l      Potassium, i-STAT 3.7 mmol/L      Calcium, Ionized i-STAT 1.14 mmol/L      Hct, i-STAT 40 %      Hgb, i-STAT 13.6 g/dl      Glucose, i-STAT 90 mg/dl      Specimen Type VENOUS    Blood culture #2 [[id number]] Collected: 07/06/23 1408    Lab Status: In process Specimen: Blood from Arm, Left Updated: 07/06/23 1418    Blood culture #1 [[id number]] Collected: 07/06/23 1408    Lab Status: In process Specimen: Blood from Hand, Right Updated: 07/06/23 1416    Fingerstick Glucose (POCT) [[id number]]  (Normal) Collected: 07/06/23 1410    Lab Status: Final result Updated: 07/06/23 1410     POC Glucose 89 mg/dl     FLU/RSV/COVID - if FLU/RSV clinically relevant [[id number]]  (Normal) Collected: 07/06/23 1324    Lab Status: Final result Specimen: Nares from Nose Updated: 07/06/23 1408     SARS-CoV-2 Negative     INFLUENZA A PCR Negative     INFLUENZA B PCR Negative     RSV PCR Negative    Narrative:      FOR PEDIATRIC PATIENTS - copy/paste COVID Guidelines URL to browser: [email or web address]    SARS-CoV-2 assay is a Nucleic Acid Amplification assay intended for the  qualitative detection of nucleic acid from SARS-CoV-2 in nasopharyngeal  swabs. Results are for the presumptive identification of SARS-CoV-2 RNA. Positive results are indicative of infection with SARS-CoV-2, the virus  causing COVID-19, but do not rule out bacterial infection or co-infection  with other viruses. Laboratories within the [facility] and its  territories are required to report all positive results to the appropriate  public health authorities.  Negative results do not preclude SARS-CoV-2  infection and should not be used as the sole basis for treatment or other  patient management decisions. Negative results must be combined with  clinical observations, patient history, and epidemiological information. This test has not been FDA cleared or approved. This test has been authorized by FDA under an Emergency Use Authorization  (EUA). This test is only authorized for the duration of time the  declaration that circumstances exist justifying the authorization of the  emergency use of an in vitro diagnostic tests for detection of SARS-CoV-2  virus and/or diagnosis of COVID-19 infection under section 564(b)(1) of  the Act, 21 U. S.C. 703WXH-5(A)(9), unless the authorization is terminated  or revoked sooner. The test has been validated but independent review by FDA  and CLIA is pending. Test performed using [employer]: This RT-PCR assay targets N2,  a region unique to SARS-CoV-2. A conserved region in the E-gene was chosen  for pan-Sarbecovirus detection which includes SARS-CoV-2. According to CMS-2020-01-R, this platform meets the definition of high-throughput technology.     HS Troponin 0hr (reflex protocol) [[id number]]  (Normal) Collected: 07/06/23 1324    Lab Status: Final result Specimen: Blood from Arm, Left Updated: 07/06/23 1358     hs TnI 0hr 5 ng/L     Protime-INR [[id number]]  (Normal) Collected: 07/06/23 1324    Lab Status: Final result Specimen: Blood from Arm, Left Updated: 07/06/23 1349     Protime 13.2 seconds      INR 0.94    APTT [[id number]]  (Normal) Collected: 07/06/23 1324    Lab Status: Final result Specimen: Blood from Arm, Left Updated: 07/06/23 1349     PTT 26 seconds     CBC and Platelet [[id number]]  (Normal) Collected: 07/06/23 1324    Lab Status: Final result Specimen: Blood from Arm, Left Updated: 07/06/23 1329     WBC 10.08 Thousand/uL      RBC 4.56 Million/uL      Hemoglobin 14.1 g/dL      Hematocrit 43.7 %      MCV 96 fL      MCH 30.9 pg      MCHC 32.3 g/dL      RDW 13.5 %      Platelets 805 Thousands/uL      MPV 10.0 fL                  X-ray chest 1 view portable   Final Result by Juwan Jurado MD (07/06 9260)      No acute cardiopulmonary disease. Workstation performed: [id number]         CT stroke alert brain   Final Result by Naomy Pringle MD (07/06 1428)   No acute intracranial hemorrhage, mass effect or edema. Workstation performed: [id number]         CTA stroke alert (head/neck)   Final Result by Naomy Pringle MD (07/06 6409)         1. No hemodynamically significant stenosis in the major arteries of the neck. 2.  No intracranial aneurysm or major intracranial arterial stenosis. 3.  No acute intracranial hemorrhage. I personally communicated the preliminary results of this study with Keshia Saeed and Billy Boewr on 7/6/2023 2:38 PM.                        Workstation performed: [id number]                    Procedures  ECG 12 Lead Documentation Only    Date/Time: 7/6/2023 4:19 PM    Performed by: Keshia Saeed DO  Authorized by: Keshia Saeed DO    Indications / Diagnosis:  Slurred speech  ECG reviewed by me, the ED Provider: yes    Patient location:  ED  Previous ECG:     Previous ECG:  Unavailable  Interpretation:     Interpretation: normal    Rate:     ECG rate:  70    ECG rate assessment: normal    Rhythm:     Rhythm: sinus rhythm    Ectopy:     Ectopy: none    QRS:     QRS axis:  Normal    QRS intervals:  Normal  Conduction:     Conduction: normal    ST segments:     ST segments:  Normal  T waves:     T waves: normal    Comments: This EKG was interpreted by me.     CriticalCare Time    Date/Time: 7/6/2023 9:45 PM    Performed by: Keshai Saeed DO  Authorized by: Keshia Saeed DO    Critical care provider statement:     Critical care time (minutes):  55    Critical care time was exclusive of:  Separately billable procedures and treating other patients and teaching time    Critical care was necessary to treat or prevent imminent or life-threatening deterioration of the following conditions:  CNS failure or compromise    Critical care was time spent personally by me on the following activities:  Obtaining history from patient or surrogate, development of treatment plan with patient or surrogate, discussions with consultants, evaluation of patient's response to treatment, examination of patient, review of old charts, re-evaluation of patient's condition, ordering and review of radiographic studies, ordering and review of laboratory studies and ordering and performing treatments and interventions             ED Course                  Stroke Assessment     Row Name 07/06/23 1418             NIH Stroke Scale    Interval Baseline      Level of Consciousness (1a.) 1      LOC Questions (1b.) 2      LOC Commands (1c.) 2      Best Gaze (2.) 0      Visual (3.) 1      Facial Palsy (4.) 2      Motor Arm, Left (5a.) 4      Motor Arm, Right (5b.) 4      Motor Leg, Left (6a.) 4      Motor Leg, Right (6b.) 4      Limb Ataxia (7.) 2      Sensory (8.) 0      Best Language (9.) 2      Dysarthria (10.) 2      Extinction and Inattention (11.) (Formerly Neglect) 1      Total 31                            SBIRT 22yo+    Flowsheet Row Most Recent Value   Initial Alcohol Screen: US AUDIT-C     1. How often do you have a drink containing alcohol? 0 Filed at: 07/06/2023 1341   2. How many drinks containing alcohol do you have on a typical day you are drinking? 0 Filed at: 07/06/2023 1341   3a. Male UNDER 65: How often do you have five or more drinks on one occasion? 0 Filed at: 07/06/2023 1341   3b. FEMALE Any Age, or MALE 65+: How often do you have 4 or more drinks on one occassion? 0 Filed at: 07/06/2023 1341   Audit-C Score 0 Filed at: 07/06/2023 1341   AVEL: How many times in the past year have you. .. Used an illegal drug or used a prescription medication for non-medical reasons?  Never Filed at: 07/06/2023 1341                    Medical Decision Making  Differential includes stroke but not seeing any specific focal deficits. Also consideration of acute psychotic episode with extensive psych hx. Other differential but not limited to drug ingestion, infection, toxins, hypoperfusion, coronary syndrome, arrhythmia    Amount and/or Complexity of Data Reviewed  Labs: ordered. Radiology: ordered. Risk  Prescription drug management. Decision regarding hospitalization. Disposition  Final diagnoses:   Slurred speech   Acute encephalopathy   Hypotensive episode     Time reflects when diagnosis was documented in both MDM as applicable and the Disposition within this note     Time User Action Codes Description Comment    7/6/2023  1:21 PM Veola Sutherland Add [R47.81] Slurred speech     7/6/2023  4:22 PM Veola Sutherland Add [G93.40] Acute encephalopathy     7/6/2023  4:25 PM Veola Sutherland Add [I95.9] Hypotensive episode     7/6/2023  6:05 PM Trudi Zonia Add [F25.0] Schizoaffective disorder, bipolar type ([address])     7/6/2023  6:05 PM Trudi Zonia Add [G93.40] Encephalopathy       ED Disposition     ED Disposition   Admit    Condition   Stable    Date/Time   Thu Jul 6, 2023  4:22 PM    Comment   Case was discussed with Isaac Berg* and the patient's admission status was agreed to be Admission Status: inpatient status to the service of Dr. Hardy Gusman* .            Follow-up Information    None         Current Discharge Medication List      CONTINUE these medications which have NOT CHANGED    Details   amLODIPine (NORVASC) 10 mg tablet Take 10 mg by mouth daily      ARIPiprazole ER (Abilify Maintena) 400 MG SRER Inject 400 mg into a muscle every 30 (thirty) days      !! benztropine (COGENTIN) 1 mg tablet Take 1 mg by mouth daily      !! benztropine (COGENTIN) 1 mg tablet Take 1 mg by mouth every 8 (eight) hours as needed for tremors      carvedilol (COREG) 25 mg tablet Take 25 mg by mouth 2 (two) times a day with meals      chlorthalidone 25 mg tablet Take 25 mg by mouth daily      citalopram (CeleXA) 20 mg tablet Take 20 mg by mouth daily      clozapine (CLOZARIL) 200 MG tablet Take 200 mg by mouth daily at bedtime      Diclofenac Sodium (VOLTAREN) 1 % Apply 2 g topically 4 (four) times a day      doxazosin (CARDURA) 1 mg tablet Take 1 mg by mouth daily at bedtime      doxepin (SINEquan) 100 mg capsule Take 100 mg by mouth daily at bedtime      fluvoxaMINE (LUVOX) 100 mg tablet Take 300 mg by mouth daily at bedtime      lisinopril (ZESTRIL) 40 mg tablet Take 40 mg by mouth daily      lithium 300 MG tablet Take 900 mg by mouth daily at bedtime      LORazepam (ATIVAN) 1 mg tablet Take 1 mg by mouth daily at bedtime      OLANZapine (ZyPREXA) 20 MG tablet Take 20 mg by mouth 2 (two) times a day      omeprazole (PriLOSEC) 20 mg delayed release capsule Take 20 mg by mouth daily      rosuvastatin (CRESTOR) 20 MG tablet Take 20 mg by mouth daily       ! ! - Potential duplicate medications found. Please discuss with provider. No discharge procedures on file.     PDMP Review     None          ED Provider  Electronically Signed by           Bhavik Sibley DO  07/06/23 2053

## 2023-07-06 NOTE — PLAN OF CARE
Problem: SAFETY, RESTRAINT - VIOLENT/SELF-DESTRUCTIVE  Goal: Remains free of harm/injury from restraints (Restraint for Violent/Self-Destructive Behavior)  Description: INTERVENTIONS:  - Instruct patient/family regarding restraint use   - Assess and monitor physiologic and psychological status   - Provide interventions and comfort measures to meet assessed patient needs   - Ensure continuous in person monitoring is provided   - Identify and implement measures to help patient regain control  - Assess readiness for release of restraint  Outcome: Progressing  Goal: Returns to optimal restraint-free functioning  Description: INTERVENTIONS:  - Assess the patient's behavior and symptoms that indicate continued need for restraint  - Identify and implement measures to help patient regain control  - Assess readiness for release of restraint   Outcome: Progressing     Problem: PAIN - ADULT  Goal: Verbalizes/displays adequate comfort level or baseline comfort level  Description: Interventions:  - Encourage patient to monitor pain and request assistance  - Assess pain using appropriate pain scale  - Administer analgesics based on type and severity of pain and evaluate response  - Implement non-pharmacological measures as appropriate and evaluate response  - Consider cultural and social influences on pain and pain management  - Notify physician/advanced practitioner if interventions unsuccessful or patient reports new pain  Outcome: Progressing     Problem: INFECTION - ADULT  Goal: Absence or prevention of progression during hospitalization  Description: INTERVENTIONS:  - Assess and monitor for signs and symptoms of infection  - Monitor lab/diagnostic results  - Monitor all insertion sites, i.e. indwelling lines, tubes, and drains  - Monitor endotracheal if appropriate and nasal secretions for changes in amount and color  - [place] appropriate cooling/warming therapies per order  - Administer medications as ordered  - Instruct and encourage patient and family to use good hand hygiene technique  - Identify and instruct in appropriate isolation precautions for identified infection/condition  Outcome: Progressing  Goal: Absence of fever/infection during neutropenic period  Description: INTERVENTIONS:  - Monitor WBC    Outcome: Progressing     Problem: SAFETY ADULT  Goal: Patient will remain free of falls  Description: INTERVENTIONS:  - Educate patient/family on patient safety including physical limitations  - Instruct patient to call for assistance with activity   - Consult OT/PT to assist with strengthening/mobility   - Keep Call bell within reach  - Keep bed low and locked with side rails adjusted as appropriate  - Keep care items and personal belongings within reach  - Initiate and maintain comfort rounds  - Make Fall Risk Sign visible to staff  - Offer Toileting every  Hours, in advance of need  - Initiate/Maintain alarm  - Obtain necessary fall risk management equipment:   - Apply yellow socks and bracelet for high fall risk patients  - Consider moving patient to room near nurses station  Outcome: Progressing  Goal: Maintain or return to baseline ADL function  Description: INTERVENTIONS:  -  Assess patient's ability to carry out ADLs; assess patient's baseline for ADL function and identify physical deficits which impact ability to perform ADLs (bathing, care of mouth/teeth, toileting, grooming, dressing, etc.)  - Assess/evaluate cause of self-care deficits   - Assess range of motion  - Assess patient's mobility; develop plan if impaired  - Assess patient's need for assistive devices and provide as appropriate  - Encourage maximum independence but intervene and supervise when necessary  - Involve family in performance of ADLs  - Assess for home care needs following discharge   - Consider OT consult to assist with ADL evaluation and planning for discharge  - Provide patient education as appropriate  Outcome: Progressing  Goal: Maintains/Returns to pre admission functional level  Description: INTERVENTIONS:  - Perform BMAT or MOVE assessment daily.   - Set and communicate daily mobility goal to care team and patient/family/caregiver. - Collaborate with rehabilitation services on mobility goals if consulted  - Perform Range of Motion  times a day. - Reposition patient every  hours. - Dangle patient  times a day  - Stand patient  times a day  - Ambulate patient  times a day  - Out of bed to chair  times a day   - Out of bed for meals times a day  - Out of bed for toileting  - Record patient progress and toleration of activity level   Outcome: Progressing     Problem: DISCHARGE PLANNING  Goal: Discharge to home or other facility with appropriate resources  Description: INTERVENTIONS:  - Identify barriers to discharge w/patient and caregiver  - Arrange for needed discharge resources and transportation as appropriate  - Identify discharge learning needs (meds, wound care, etc.)  - Arrange for interpretive services to assist at discharge as needed  - Refer to Case Management Department for coordinating discharge planning if the patient needs post-hospital services based on physician/advanced practitioner order or complex needs related to functional status, cognitive ability, or social support system  Outcome: Progressing     Problem: Knowledge Deficit  Goal: Patient/family/caregiver demonstrates understanding of disease process, treatment plan, medications, and discharge instructions  Description: Complete learning assessment and assess knowledge base. Interventions:  - Provide teaching at level of understanding  - Provide teaching via preferred learning methods  Outcome: Progressing     Problem: Nutrition/Hydration-ADULT  Goal: Nutrient/Hydration intake appropriate for improving, restoring or maintaining nutritional needs  Description: Monitor and assess patient's nutrition/hydration status for malnutrition.  Collaborate with interdisciplinary team and initiate plan and interventions as ordered. Monitor patient's weight and dietary intake as ordered or per policy. Utilize nutrition screening tool and intervene as necessary. Determine patient's food preferences and provide high-protein, high-caloric foods as appropriate.      INTERVENTIONS:  - Monitor oral intake, urinary output, labs, and treatment plans  - Assess nutrition and hydration status and recommend course of action  - Evaluate amount of meals eaten  - Assist patient with eating if necessary   - Allow adequate time for meals  - Recommend/ encourage appropriate diets, oral nutritional supplements, and vitamin/mineral supplements  - Order, calculate, and assess calorie counts as needed  - Recommend, monitor, and adjust tube feedings and TPN/PPN based on assessed needs  - Assess need for intravenous fluids  - Provide specific nutrition/hydration education as appropriate  - Include patient/family/caregiver in decisions related to nutrition  Outcome: Progressing

## 2023-07-06 NOTE — RESTRAINT FACE TO FACE
Restraint Face to Face   Demetrio Gonzalez 77 y.o. male MRN: [id number]  Unit/Bed#: ED 02 Encounter: [id number]      Physical Evaluation fair  Purpose for Restraints/ Seclusion High risk for causing significant disruption of treatment environment   Patient's reaction to the intervention poor  Patient's medical condition poor  Patient's Behavioral condition poor  Restraints to be Continued

## 2023-07-06 NOTE — QUICK NOTE
23-year-old male with past medical history of OCD, schizoaffective presented, group home with slurred speech and encephalopathy. Patient was given Narcan by EMS. Patient was initially encephalopathy on arrival but after a few minutes he came agitated. Patient was placed in restraints. Patient underwent CT, CTA head and neck which was unremarkable for acute stroke, edema or hemorrhage. Patient was given Haldol, Ativan. Currently patient is arousable but not able to give a good history. Vitals, lab and imaging were reviewed  Exam  Chest-clear to auscultation  Abdomen-soft, nontender  Neuro-arousable to sternal rub. Heart- S1,S2 regular  Assessment and plan  Acute encephalopathy, seizure, schizoaffective disorder GERD, hyperlipidemia  Neurology consult appreciated  As per neurology no concern for stroke.   B12, TSH  Continue on Norvasc, Lipitor, Cardura, Coreg, chlorthalidone, Zestril  On Cogentin, lithium, Zyprexa, doxepin, Clozaril, Celexa and Luvox  Psychiatry consult  DVT prophylaxis

## 2023-07-06 NOTE — ASSESSMENT & PLAN NOTE
· Remote h/o seizure in 1971  · [facility] after episode of unresponsiveness and questionable post-ictal confusion at home   · EEG in 2019 normal, Neuro felt there was no concern for neurologic etiology  · Event was felt to be related to underlying psychiatric disorder vs psychiatric medication effect

## 2023-07-06 NOTE — ASSESSMENT & PLAN NOTE
· Extensive review of med rec, personally entered every medication with records from group home  · Pt maintained on IM Abilify Maintena ER every month, citalopram daily, clozapine hs, fluvoxaminehs, doxepin hs, lorazepam hs, lithium hs, olanzapine BID, Cogentin daily   · Also on Cogentin prn EPS  · Appreciate psych consult, likely overmedication and polypharmacy contributing to presentation  · Resume home meds once more alert

## 2023-07-06 NOTE — ASSESSMENT & PLAN NOTE
· Normotensive here  · Can continue lisinopril 40 mg, carvedilol 25 mg BID, chlorthalidone 25 mg daily, doxazosin 1 mg daily and amlodipine 10 mg daily  · Resume home meds once taking PO/more alert, prn IV hydral for now

## 2023-07-06 NOTE — H&P
[facility]  H&P  Name: Babatunde Newton 77 y.o. male I MRN: [id number]  Unit/Bed#: -[place] I Date of Admission: 7/6/2023   Date of Service: 7/6/2023 I Hospital Day: 0      Assessment/Plan   GERD (gastroesophageal reflux disease)  Assessment & Plan  · Continue PPI    Hyperlipidemia  Assessment & Plan  · Continue statin    Hypertension  Assessment & Plan  · Normotensive here  · Can continue lisinopril 40 mg, carvedilol 25 mg BID, chlorthalidone 25 mg daily, doxazosin 1 mg daily and amlodipine 10 mg daily  · Resume home meds once taking PO/more alert, prn IV hydral for now    Seizure [facility])  Assessment & Plan  · Remote h/o seizure in 1971  · Seen by Neuro in 2019 at [address] [facility] for eval for recurrent seizure after episode of unresponsiveness and questionable post-ictal confusion at home   · EEG in 2019 normal, Neuro felt there was no concern for neurologic etiology  · Event was felt to be related to underlying psychiatric disorder vs psychiatric medication effect    Schizoaffective disorder, bipolar type ([address])  Assessment & Plan  · Extensive review of med rec, personally entered every medication with records from group home  · Pt maintained on IM Abilify Maintena ER every month, citalopram daily, clozapine hs, fluvoxaminehs, doxepin hs, lorazepam hs, lithium hs, olanzapine BID, Cogentin daily   · Also on Cogentin prn EPS  · Appreciate psych consult, likely overmedication and polypharmacy contributing to presentation  · Resume home meds once more alert    * Encephalopathy  Assessment & Plan  · Reportedly dysarthric and encephalopathic per EMS en route to ED  · Narcan given en route  · Upon arrival became agitated in CT scanner, required IV Ativan and haldol and sedated in restraints  · CTH negative   · UDS negative  · Neuro consulted for possible stroke, no concern for stroke presently nor for seizure   · Can consider MRI if no return to baseline mentation  · Continue workup for metabolic etiology: B12, TSH, ETOH lvl  · No concern for infectious etiology: no leukocytosis, afebrile, procal wnl, CXR clear, no urinary complaints, neg lactate  · Pt has previously been seen in [address] [facility] ED for similar presentation with decreased responsiveness  · EEG was normal, Neuro felt no neurologic cause at that time  · [place] to be psychiatric in nature  · Likewise feel this presentation may be more so related to psychiatric illness and psychotropic medications  · Pt is currently taking 8 psychotropic medications at high doses  · Lithium level was suprisingly low, pt is on 3x max dose   · Will consult psych  · NPO until more alert      VTE Pharmacologic Prophylaxis: VTE Score: 5 High Risk (Score >/= 5) - Pharmacological DVT Prophylaxis Ordered: enoxaparin (Lovenox). Sequential Compression Devices Ordered. Code Status: Level 1 - Full Code (presumed)  Discussion with family: None. Anticipated Length of Stay: Patient will be admitted on an inpatient basis with an anticipated length of stay of greater than 2 midnights secondary to encephalopathy. Total Time Spent on Date of Encounter in care of patient: 55 minutes This time was spent on one or more of the following: performing physical exam; counseling and coordination of care; obtaining or reviewing history; documenting in the medical record; reviewing/ordering tests, medications or procedures; communicating with other healthcare professionals and discussing with patient's family/caregivers. Chief Complaint: encephalopathy    History of Present Illness:  Babatunde Newton is a 77 y.o. male with a PMH of hypertension, hyperlipidemia, GERD, schizophrenia, OCD who presents with slurred speech and encephalopathy per group home and EMS. Patient received Narcan in route to the hospital. Originally somnolent on arrival. Upon entering the CT scanner, he became agitated requiring IV Ativan, IV Haldol and restraints.   At time of interview, patient is extremely somnolent and unable to meaningfully answer interview questions. Review of Systems:  Review of Systems   Unable to perform ROS: Mental status change       Past Medical and Surgical History:   Past Medical History:   Diagnosis Date   • Obsessive compulsive disorder    • Schizoaffective disorder, bipolar type ([address])        History reviewed. No pertinent surgical history. Meds/Allergies:  Prior to Admission medications    Medication Sig Start Date End Date Taking?  Authorizing Provider   amLODIPine (NORVASC) 10 mg tablet Take 10 mg by mouth daily   Yes Historical Provider, MD   ARIPiprazole ER (Abilify Maintena) 400 MG SRER Inject 400 mg into a muscle every 30 (thirty) days   Yes Historical Provider, MD   benztropine (COGENTIN) 1 mg tablet Take 1 mg by mouth daily   Yes Historical Provider, MD   benztropine (COGENTIN) 1 mg tablet Take 1 mg by mouth every 8 (eight) hours as needed for tremors   Yes Historical Provider, MD   carvedilol (COREG) 25 mg tablet Take 25 mg by mouth 2 (two) times a day with meals   Yes Historical Provider, MD   chlorthalidone 25 mg tablet Take 25 mg by mouth daily   Yes Historical Provider, MD   citalopram (CeleXA) 20 mg tablet Take 20 mg by mouth daily   Yes Historical Provider, MD   clozapine (CLOZARIL) 200 MG tablet Take 200 mg by mouth daily at bedtime   Yes Historical Provider, MD   Diclofenac Sodium (VOLTAREN) 1 % Apply 2 g topically 4 (four) times a day   Yes Historical Provider, MD   doxazosin (CARDURA) 1 mg tablet Take 1 mg by mouth daily at bedtime   Yes Historical Provider, MD   doxepin (SINEquan) 100 mg capsule Take 100 mg by mouth daily at bedtime   Yes Historical Provider, MD   fluvoxaMINE (LUVOX) 100 mg tablet Take 300 mg by mouth daily at bedtime   Yes Historical Provider, MD   lisinopril (ZESTRIL) 40 mg tablet Take 40 mg by mouth daily   Yes Historical Provider, MD   lithium 300 MG tablet Take 900 mg by mouth daily at bedtime   Yes Historical Provider, MD   LORazepam (ATIVAN) 1 mg tablet Take 1 mg by mouth daily at bedtime   Yes Historical Provider, MD   OLANZapine (ZyPREXA) 20 MG tablet Take 20 mg by mouth 2 (two) times a day   Yes Historical Provider, MD   omeprazole (PriLOSEC) 20 mg delayed release capsule Take 20 mg by mouth daily   Yes Historical Provider, MD   rosuvastatin (CRESTOR) 20 MG tablet Take 20 mg by mouth daily   Yes Historical Provider, MD     Verified all medications with group home med rec. .     Allergies: Allergies   Allergen Reactions   • Aspirin Anaphylaxis   • Penicillamine Drowsiness       Social History:  Marital Status: Single   Occupation: Not discussed  Patient Pre-hospital Living Situation: Group home  Patient Pre-hospital Level of Mobility: Not discussed  Patient Pre-hospital Diet Restrictions: not discussed  Substance Use History:   Social History     Substance and Sexual Activity   Alcohol Use None     Social History     Tobacco Use   Smoking Status Unknown   Smokeless Tobacco Not on file     Social History     Substance and Sexual Activity   Drug Use Not on file       Family History:  Family History   Family history unknown: Yes       Physical Exam:     Vitals:   Blood Pressure: 110/62 (07/06/23 1715)  Pulse: 67 (07/06/23 1715)  Temperature: 98 °F (36.7 °C) (07/06/23 1700)  Temp Source: Axillary (07/06/23 1700)  Respirations: 16 (07/06/23 1715)  Height: 5' 8" (172.7 cm) (07/06/23 1837)  Weight - Scale: 87.3 kg (192 lb 7.4 oz) (07/06/23 1404)  SpO2: 96 % (07/06/23 1715)    Physical Exam  Vitals and nursing note reviewed. Constitutional:       General: He is not in acute distress. Appearance: Normal appearance. He is normal weight. He is not ill-appearing or toxic-appearing. Comments: Somnolent but easily arousable   HENT:      Head: Normocephalic and atraumatic.       Right Ear: External ear normal.      Left Ear: External ear normal.      Nose: Nose normal.      Mouth/Throat:      Mouth: Mucous membranes are moist.      Comments: Poor dentition  Eyes: Conjunctiva/sclera: Conjunctivae normal.      Pupils: Pupils are equal, round, and reactive to light. Cardiovascular:      Rate and Rhythm: Normal rate and regular rhythm. Pulses: Normal pulses. Heart sounds: No murmur heard. No gallop. Pulmonary:      Effort: Pulmonary effort is normal. No respiratory distress. Breath sounds: Normal breath sounds. No stridor. No wheezing, rhonchi or rales. Chest:      Chest wall: No tenderness. Abdominal:      General: Abdomen is flat. Bowel sounds are normal. There is no distension. Palpations: Abdomen is soft. There is no mass. Tenderness: There is no abdominal tenderness. There is no guarding or rebound. Hernia: No hernia is present. Genitourinary:     Comments: Spencer catheter in place (placed in ED)  Musculoskeletal:      Cervical back: Normal range of motion. Right lower leg: No edema. Left lower leg: No edema. Skin:     General: Skin is warm and dry. Neurological:      Mental Status: He is disoriented.    Psychiatric:      Comments: Somnolent          Additional Data:     Lab Results:  Results from last 7 days   Lab Units 07/06/23  1418 07/06/23  1324   WBC Thousand/uL  --  10.08   HEMOGLOBIN g/dL  --  14.1   I STAT HEMOGLOBIN g/dl 13.6  --    HEMATOCRIT %  --  43.7   HEMATOCRIT, ISTAT % 40  --    PLATELETS Thousands/uL  --  195     Results from last 7 days   Lab Units 07/06/23  1418 07/06/23  1408   SODIUM mmol/L  --  137   POTASSIUM mmol/L  --  3.7   CHLORIDE mmol/L  --  104   CO2 mmol/L  --  26   CO2, I-STAT mmol/L 28  --    BUN mg/dL  --  24   CREATININE mg/dL  --  1.15   ANION GAP mmol/L  --  7   CALCIUM mg/dL  --  8.5   ALBUMIN g/dL  --  3.4*   TOTAL BILIRUBIN mg/dL  --  0.46   ALK PHOS U/L  --  44   ALT U/L  --  26   AST U/L  --  14   GLUCOSE RANDOM mg/dL  --  85     Results from last 7 days   Lab Units 07/06/23  1324   INR  0.94     Results from last 7 days   Lab Units 07/06/23  1410   POC GLUCOSE mg/dl 89 Results from last 7 days   Lab Units 07/06/23  1408   LACTIC ACID mmol/L 1.5   PROCALCITONIN ng/ml 0.10       Lines/Drains:  Invasive Devices     Peripheral Intravenous Line  Duration           Peripheral IV 07/06/23 Dorsal (posterior); Right Hand <1 day    Peripheral IV 07/06/23 Left Antecubital <1 day          Drain  Duration           Urethral Catheter Straight-tip 16 Fr. <1 day              Urinary Catheter:  Goal for removal: Voiding trial when ambulation improves             Imaging: Reviewed radiology reports from this admission including: xray(s)  X-ray chest 1 view portable   Final Result by Zechariah Hilario MD (07/06 1500)      No acute cardiopulmonary disease. Workstation performed: [id number]         CT stroke alert brain   Final Result by Carl Xavier MD (07/06 1696)   No acute intracranial hemorrhage, mass effect or edema. Workstation performed: [id number]         CTA stroke alert (head/neck)   Final Result by Carl Xavier MD (07/06 6956)         1. No hemodynamically significant stenosis in the major arteries of the neck. 2.  No intracranial aneurysm or major intracranial arterial stenosis. 3.  No acute intracranial hemorrhage. I personally communicated the preliminary results of this study with Lulú Lehman and Clara Mccloud on 7/6/2023 2:38 PM.                        Workstation performed: [id number]             EKG and Other Studies Reviewed on Admission:   · EKG: NSR. HR 70.    ** Please Note: This note has been constructed using a voice recognition system.  **

## 2023-07-06 NOTE — STROKE DOCUMENTATION
Graduate nurse sent report to [address] [place] [address] To [facility].

## 2023-07-06 NOTE — ASSESSMENT & PLAN NOTE
· Reportedly dysarthric and encephalopathic per EMS en route to ED  · Narcan given en route  · Upon arrival became agitated in CT scanner, required IV Ativan and haldol and sedated in restraints  · CTH negative   · UDS negative  · Neuro consulted for possible stroke, no concern for stroke presently nor for seizure   · Can consider MRI if no return to baseline mentation  · Continue workup for metabolic etiology: G16, TSH, ETOH lvl  · No concern for infectious etiology: no leukocytosis, afebrile, procal wnl, CXR clear, no urinary complaints, neg lactate  · Pt has previously been seen in [facility] ED for similar presentation with decreased responsiveness  · EEG was normal, Neuro felt no neurologic cause at that time  · [place] to be psychiatric in nature  · Likewise feel this presentation may be more so related to psychiatric illness and psychotropic medications  · Pt is currently taking 8 psychotropic medications at high doses  · Lithium level was suprisingly low, pt is on 3x max dose   · Will consult psych  · NPO until more alert

## 2023-07-07 LAB
ALBUMIN SERPL BCP-MCNC: 3.5 G/DL (ref 3.5–5)
ALP SERPL-CCNC: 45 U/L (ref 34–104)
ALT SERPL W P-5'-P-CCNC: 25 U/L (ref 7–52)
ANION GAP SERPL CALCULATED.3IONS-SCNC: 5 MMOL/L
AST SERPL W P-5'-P-CCNC: 18 U/L (ref 13–39)
BASOPHILS # BLD AUTO: 0.03 THOUSANDS/ÂΜL (ref 0–0.1)
BASOPHILS NFR BLD AUTO: 0 % (ref 0–1)
BILIRUB SERPL-MCNC: 0.49 MG/DL (ref 0.2–1)
BUN SERPL-MCNC: 18 MG/DL (ref 5–25)
CALCIUM SERPL-MCNC: 8.8 MG/DL (ref 8.4–10.2)
CHLORIDE SERPL-SCNC: 107 MMOL/L (ref 96–108)
CO2 SERPL-SCNC: 28 MMOL/L (ref 21–32)
CREAT SERPL-MCNC: 0.8 MG/DL (ref 0.6–1.3)
EOSINOPHIL # BLD AUTO: 0.09 THOUSAND/ÂΜL (ref 0–0.61)
EOSINOPHIL NFR BLD AUTO: 1 % (ref 0–6)
ERYTHROCYTE [DISTWIDTH] IN BLOOD BY AUTOMATED COUNT: 13.8 % (ref 11.6–15.1)
GFR SERPL CREATININE-BSD FRML MDRD: 90 ML/MIN/1.73SQ M
GLUCOSE SERPL-MCNC: 91 MG/DL (ref 65–140)
HCT VFR BLD AUTO: 42.6 % (ref 36.5–49.3)
HGB BLD-MCNC: 13.7 G/DL (ref 12–17)
IMM GRANULOCYTES # BLD AUTO: 0.03 THOUSAND/UL (ref 0–0.2)
IMM GRANULOCYTES NFR BLD AUTO: 0 % (ref 0–2)
LYMPHOCYTES # BLD AUTO: 0.92 THOUSANDS/ÂΜL (ref 0.6–4.47)
LYMPHOCYTES NFR BLD AUTO: 10 % (ref 14–44)
MAGNESIUM SERPL-MCNC: 2.2 MG/DL (ref 1.9–2.7)
MCH RBC QN AUTO: 30.7 PG (ref 26.8–34.3)
MCHC RBC AUTO-ENTMCNC: 32.2 G/DL (ref 31.4–37.4)
MCV RBC AUTO: 96 FL (ref 82–98)
MONOCYTES # BLD AUTO: 0.7 THOUSAND/ÂΜL (ref 0.17–1.22)
MONOCYTES NFR BLD AUTO: 8 % (ref 4–12)
NEUTROPHILS # BLD AUTO: 7.46 THOUSANDS/ÂΜL (ref 1.85–7.62)
NEUTS SEG NFR BLD AUTO: 81 % (ref 43–75)
NRBC BLD AUTO-RTO: 0 /100 WBCS
PLATELET # BLD AUTO: 175 THOUSANDS/UL (ref 149–390)
PMV BLD AUTO: 9.8 FL (ref 8.9–12.7)
POTASSIUM SERPL-SCNC: 3.7 MMOL/L (ref 3.5–5.3)
PROT SERPL-MCNC: 6.6 G/DL (ref 6.4–8.4)
RBC # BLD AUTO: 4.46 MILLION/UL (ref 3.88–5.62)
SODIUM SERPL-SCNC: 140 MMOL/L (ref 135–147)
T4 FREE SERPL-MCNC: 0.82 NG/DL (ref 0.61–1.12)
VIT B12 SERPL-MCNC: 286 PG/ML (ref 180–914)
WBC # BLD AUTO: 9.23 THOUSAND/UL (ref 4.31–10.16)

## 2023-07-07 PROCEDURE — 80159 DRUG ASSAY CLOZAPINE: CPT | Performed by: PHYSICIAN ASSISTANT

## 2023-07-07 PROCEDURE — 99232 SBSQ HOSP IP/OBS MODERATE 35: CPT | Performed by: PHYSICIAN ASSISTANT

## 2023-07-07 PROCEDURE — 83735 ASSAY OF MAGNESIUM: CPT | Performed by: INTERNAL MEDICINE

## 2023-07-07 PROCEDURE — 85025 COMPLETE CBC W/AUTO DIFF WBC: CPT | Performed by: INTERNAL MEDICINE

## 2023-07-07 PROCEDURE — 80053 COMPREHEN METABOLIC PANEL: CPT | Performed by: INTERNAL MEDICINE

## 2023-07-07 PROCEDURE — G0425 INPT/ED TELECONSULT30: HCPCS | Performed by: PSYCHIATRY & NEUROLOGY

## 2023-07-07 RX ORDER — BENZTROPINE MESYLATE 1 MG/1
0.5 TABLET ORAL DAILY
Status: DISCONTINUED | OUTPATIENT
Start: 2023-07-08 | End: 2023-07-11 | Stop reason: HOSPADM

## 2023-07-07 RX ORDER — DOXEPIN HYDROCHLORIDE 50 MG/1
50 CAPSULE ORAL
Status: DISCONTINUED | OUTPATIENT
Start: 2023-07-07 | End: 2023-07-11 | Stop reason: HOSPADM

## 2023-07-07 RX ADMIN — SODIUM CHLORIDE 75 ML/HR: 0.9 INJECTION, SOLUTION INTRAVENOUS at 17:52

## 2023-07-07 RX ADMIN — BENZTROPINE MESYLATE 1 MG: 1 TABLET ORAL at 09:45

## 2023-07-07 RX ADMIN — SODIUM CHLORIDE 75 ML/HR: 0.9 INJECTION, SOLUTION INTRAVENOUS at 04:26

## 2023-07-07 RX ADMIN — OLANZAPINE 20 MG: 10 TABLET, FILM COATED ORAL at 09:51

## 2023-07-07 RX ADMIN — DOXEPIN HYDROCHLORIDE 50 MG: 50 CAPSULE ORAL at 23:14

## 2023-07-07 RX ADMIN — CARVEDILOL 25 MG: 12.5 TABLET, FILM COATED ORAL at 09:45

## 2023-07-07 RX ADMIN — CITALOPRAM 20 MG: 20 TABLET, FILM COATED ORAL at 09:45

## 2023-07-07 RX ADMIN — OLANZAPINE 20 MG: 10 TABLET, FILM COATED ORAL at 17:15

## 2023-07-07 RX ADMIN — ATORVASTATIN CALCIUM 40 MG: 40 TABLET, FILM COATED ORAL at 17:16

## 2023-07-07 RX ADMIN — CHLORTHALIDONE 25 MG: 25 TABLET ORAL at 09:51

## 2023-07-07 RX ADMIN — LITHIUM CARBONATE 900 MG: 300 TABLET, EXTENDED RELEASE ORAL at 23:14

## 2023-07-07 RX ADMIN — LISINOPRIL 40 MG: 20 TABLET ORAL at 09:44

## 2023-07-07 RX ADMIN — AMLODIPINE BESYLATE 10 MG: 5 TABLET ORAL at 09:44

## 2023-07-07 RX ADMIN — PANTOPRAZOLE SODIUM 40 MG: 40 TABLET, DELAYED RELEASE ORAL at 09:45

## 2023-07-07 RX ADMIN — ENOXAPARIN SODIUM 40 MG: 100 INJECTION SUBCUTANEOUS at 09:44

## 2023-07-07 NOTE — TELEMEDICINE
TeleConsultation - [address] [employer] 77 y.o. male MRN: [id number]  Unit/Bed#: -01 Encounter: [id number]        REQUIRED DOCUMENTATION:     1. This service was provided via Telemedicine. 2. Provider located at [facility].  3. TeleMed provider: Rivka Monk MD.  4. Identify all parties in room with patient during tele consult:  pt and his sister with his consent   5. Patient was then informed that this was a Telemedicine visit and that the exam was being conducted confidentially over secure lines. My office door was closed. No one else was in the room. Patient acknowledged consent and understanding of privacy and security of the Telemedicine visit, and gave us permission to have the assistant stay in the room in order to assist with the history and to conduct the exam.  I informed the patient that I have reviewed their record in Epic and presented the opportunity for them to ask any questions regarding the visit today. The patient agreed to participate. Assessment/Plan     Present on Admission:  **None**    Assessment:    Acute encephalopathy/delirium possibly multifactorial in nature (to include polypharmacy) superimposed on schizoaffective disorder bipolar type by history; PTSD by history; OCD by history    Treatment Plan:    Recommend to discontinue Celexa as he is on high-dose Luvox as a duplicate SSRI. Recommend to discontinue Cogentin to reduce the anticholinergic burden that may be contributing to encephalopathy/delirium. Recommend decreasing doxepin to 50 mg p.o. nightly to further reduce anticholinergic burden. Recommend rechecking lithium level in the morning as the patient's lithium level is surprisingly low based on his dose suggesting some concern that there may have been some missed compliance recently. No suicide precautions are indicated at this time. Reconsult psychiatry as needed.   Recommend to reconsult psychiatry prior to discharge to determine most appropriate psychiatric disposition if he appears to have failed to return to his baseline functioning upon medical clearance.     Recommend Delirium Precautions:  Maintain sleep-wake cycle, avoid nighttime interruptions  Provide adequate pain control  Avoid urinary retention and constipation  Provide frequent and early mobilization  Provide frequent redirection and reorientation as needed  Attempt to avoid medications that may worsen or precipitate delirium such as tramadol, benzodiazepines, anticholinergics, and Benadryl  Redirect unwanted behaviors as first-line therapy, avoid physical restraints as able to  Provide frequent reorientation  Continue to monitor        Current Medications:     Current Facility-Administered Medications   Medication Dose Route Frequency Provider Last Rate   • amLODIPine  10 mg Oral Daily Dalbert Bottoms, PA-C     • atorvastatin  40 mg Oral Daily With Dinner Dalbert Bottoms, PA-C     • benztropine  1 mg Oral Daily [facility]     • benztropine  1 mg Oral Q8H PRN [facility] Bottoms, PA-C     • carvedilol  25 mg Oral BID [facility]     • chlorthalidone  25 mg Oral Daily [facility] Bottoms, PA-C     • citalopram  20 mg Oral Daily [facility]     • clozapine  200 mg Oral HS Dalbert Bottoms, PA-C     • doxazosin  1 mg Oral HS Dalbert Bottoms, PA-C     • doxepin  100 mg Oral HS Dalbert Bottoms, PA-C     • enoxaparin  40 mg Subcutaneous Q24H [address] Rakesh Acevedo MD     • fluvoxaMINE  300 mg Oral HS Dalbert Bottoms, PA-C     • hydrALAZINE  5 mg Intravenous Q6H PRN Dalbert Bottoms, PA-C     • iohexol  85 mL Intravenous Once in imaging Dalbert Bottoms, PA-C     • lisinopril  40 mg Oral Daily Dalbert Bottoms, PA-C     • lithium carbonate  900 mg Oral HS Dalbert Bottoms, PA-C     • OLANZapine  20 mg Oral BID Dalbert Bottoms, PA-C     • pantoprazole  40 mg Oral Early Morning Dalbert Bottoms, PA-C     • pneumococcal 20-rohini conj vacc  0.5 mL Intramuscular Once Ashly Duffy MD     • sodium chloride  75 mL/hr Intravenous Continuous Alicia Sanchez PA-C 75 mL/hr (07/07/23 0052)       Risks / Benefits of Treatment:    Risks, benefits, and possible side effects of medications explained to patient and patient verbalizes understanding. Other treatment modalities recommended as indicated:    · outpatient referral      Inpatient consult to Psychiatry  Consult performed by: Jose Carranza MD  Consult ordered by: Alicia Sanchez PA-C        Physician Requesting Consult: Ashly Duffy MD  Principal Problem:Encephalopathy    Reason for Consult: Encephalopathy; schizoaffective disorder bipolar type      History of Present Illness      Patient is a 77 y.o. male who presented to the emergency department where the physician documented the following:  History from paramedics and caretaker at [facility]. 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 They gave Narcan 2 mg he became more alert but still unintelligible.              Prior to Admission Medications   Prescriptions Last Dose Informant Patient Reported? Taking?    ARIPiprazole ER (Abilify Maintena) 400 MG SRER 7/5/2023   Yes Yes   Sig: Inject 400 mg into a muscle every 30 (thirty) days   Diclofenac Sodium (VOLTAREN) 1 % 7/5/2023   Yes Yes   Sig: Apply 2 g topically 4 (four) times a day   LORazepam (ATIVAN) 1 mg tablet 7/5/2023   Yes Yes   Sig: Take 1 mg by mouth daily at bedtime   OLANZapine (ZyPREXA) 20 MG tablet 7/5/2023   Yes Yes   Sig: Take 20 mg by mouth 2 (two) times a day   amLODIPine (NORVASC) 10 mg tablet 7/5/2023   Yes Yes   Sig: Take 10 mg by mouth daily   benztropine (COGENTIN) 1 mg tablet 7/5/2023   Yes Yes   Sig: Take 1 mg by mouth daily   benztropine (COGENTIN) 1 mg tablet 7/5/2023   Yes Yes   Sig: Take 1 mg by mouth every 8 (eight) hours as needed for tremors   carvedilol (COREG) 25 mg tablet 7/5/2023   Yes Yes   Sig: Take 25 mg by mouth 2 (two) times a day with meals   chlorthalidone 25 mg tablet 7/5/2023   Yes Yes   Sig: Take 25 mg by mouth daily   citalopram (CeleXA) 20 mg tablet 7/5/2023   Yes Yes   Sig: Take 20 mg by mouth daily   clozapine (CLOZARIL) 200 MG tablet 7/5/2023   Yes Yes   Sig: Take 200 mg by mouth daily at bedtime   doxazosin (CARDURA) 1 mg tablet 7/5/2023   Yes Yes   Sig: Take 1 mg by mouth daily at bedtime   doxepin (SINEquan) 100 mg capsule 7/5/2023   Yes Yes   Sig: Take 100 mg by mouth daily at bedtime   fluvoxaMINE (LUVOX) 100 mg tablet 7/5/2023   Yes Yes   Sig: Take 300 mg by mouth daily at bedtime   lisinopril (ZESTRIL) 40 mg tablet 7/5/2023   Yes Yes   Sig: Take 40 mg by mouth daily   lithium 300 MG tablet 7/5/2023   Yes Yes   Sig: Take 900 mg by mouth daily at bedtime   omeprazole (PriLOSEC) 20 mg delayed release capsule 7/5/2023   Yes Yes   Sig: Take 20 mg by mouth daily   rosuvastatin (CRESTOR) 20 MG tablet 7/5/2023   Yes Yes   Sig: Take 20 mg by mouth daily      Facility-Administered Medications: None         Medical History[]Expand by Default   Past Medical History:   Diagnosis Date   • Obsessive compulsive disorder     • Schizoaffective disorder, bipolar type ([address])              Surgical History[]Expand by Default   History reviewed. No pertinent surgical history.        Family History[]Expand by Default   Family History   Family history unknown: Yes         I have reviewed and agree with the history as documented.     E-Cigarette/Vaping      E-Cigarette/Vaping Substances      Social History           Tobacco Use   • Smoking status: Unknown         Review of Systems   Unable to perform ROS: Mental status change   All other systems reviewed and are negative.     Per neurology:  77 year with medical hx of of schizoaffective disorder, bipolar, PTSD, OCD, HTN, hyperlipidemia, and prediabetes. It was reported the patient to be dysarthric and encephalopathic at 9 a.m., last unknown well unclear. The patient was encephalopathic per EMS and narcan was given. Initially in the CT scanner, the patient was encephalopathic, however, after a few minutes,  he developed a hyperactive delirium. NIH was noted to be a 6, with no gross focal findings noted (thought limited with agitation). The patient became agitated and combative, he was placed in lock restraints, IV haldol and benzos, control team was called.      The patient was unable to have a CT scan of his head, CTA of the head and neck secondary to safety issues. We believe presenting symptoms are less likely stroke, he is also out of the time window for TNK, no evidence to suspect LVO. It was reported he was hypotensive on arrival with blood pressures in the 60s.    • Suspect, presentation is less likely stroke as examination findings are non focal, this may be related to a toxic metabolic/infectious derangement, rule out acute intoxication, medication related, vs secondary to his underlying psychiatric hx with a hyperactive delirium. No evidence to suspect seizure. This may be related to cerebral hypoperfusion as his blood pressures have been on the lower side with readings in the sixties prior to arrival.      - CT imaging of head when able/safe. CT of head was delayed, but this showed no acute intracranial abnormality.   CTA with no lvo or significant stenosis.    -No need for MRI at this time, only consider MRI if he does not return to baseline, upon awakening.   -The patient is being treated for hyperactive delirium, agitation -   control team was activated the patient was given lorazepam, Haldol, and Narcan in the ED  -Continue to monitor for infectious metabolic derangement, would check a urine drug screen, etoh level, b12, TSH as well as work-up for infectious/metabolic etiologies  -Avoid hypotension for cerebral perfusion.   -Patient will eventually need a psychiatric consult  -Unless CT imaging abnormality or new focality seen on examination, no further neurological recommendations  -Please see attestation by attending neurologist, plan of care per attending neurologist.     Nursing reports the patient was able to come out of restraints today and has not exhibited any behavioral disturbances since. In meeting with the patient, he does not appear to recall the events that led to his admission. Per his sister who is at bedside, she was told that the patient developed hypotension, lethargy and slurred speech at the nursing facility and therefore he was transported to the emergency department for evaluation. Past psychiatric history: According to patient's sister he has history of paranoid schizophrenia. Medical records show history of schizoaffective disorder bipolar type, PTSD, OCD. Sister reports patient has had several psychiatric hospitalizations for agitation but last was years ago per her report. She states in recent years he has been doing well and has been very active person working out and getting out and walking. She is concerned that perhaps at times she is overly active and perhaps does not stay hydrated. Social history: The patient lives in a group home. Family history: No pertinent family history was reported. Substance use history: No substance use reported at this time. Mental status examination: The patient is alert and resting in his bed at the time of the assessment. Speech remains slurred and garbled and is only intelligible at times. Speech may be somewhat pressured. He was able to give me his name and his birthdate. He was able to tell me he was in the hospital.  He was able to tell me it was July 2023. Sister confirmed that the patient's speech is slurred significantly beyond his baseline.   As the patient's speech was largely unintelligible it was difficult to further assess thought process and content and to assess for psychotic features. He did denies suicidal homicidal ideation and hallucinations. He has demonstrated impaired insight and judgment. Past Medical History:   Diagnosis Date   • Obsessive compulsive disorder    • Schizoaffective disorder, bipolar type ([address])        Medical Review Of Systems:    Review of Systems    Meds/Allergies     all current active meds have been reviewed  Allergies   Allergen Reactions   • Aspirin Anaphylaxis   • Penicillamine Drowsiness       Objective     Vital signs in last 24 hours:  Temp:  [97.6 °F (36.4 °C)-98.3 °F (36.8 °C)] 97.6 °F (36.4 °C)  HR:  [64-80] 74  Resp:  [16-20] 16  BP: ()/(51-73) 118/73      Intake/Output Summary (Last 24 hours) at 7/7/2023 1151  Last data filed at 7/7/2023 0915  Gross per 24 hour   Intake 1894.17 ml   Output 3600 ml   Net -1705.83 ml       Lab Results: I have personally reviewed all pertinent laboratory/tests results. Imaging Studies: X-ray chest 1 view portable    Result Date: 7/6/2023  Narrative: CHEST INDICATION:   encephalopathy. COMPARISON:  None EXAM PERFORMED/VIEWS:  XR CHEST PORTABLE FINDINGS: Cardiomediastinal silhouette appears unremarkable. The lungs are clear. No pneumothorax or pleural effusion. Osseous structures appear within normal limits for patient age. Impression: No acute cardiopulmonary disease. Workstation performed: [id number]     CTA stroke alert (head/neck)    Result Date: 7/6/2023  Narrative: CTA NECK AND BRAIN WITH CONTRAST INDICATION: Stroke Alert change in behavior around 9:00 a.m. this morning. Worsening garbled speech. Hypotensive. Status post Narcan administration. COMPARISON:   None. TECHNIQUE:   Post contrast imaging was performed after administration of iodinated contrast through the neck and brain. Post contrast axial 0.625 mm images timed to opacify the arterial system.  3D rendering was performed on an independent workstation. MIP reconstructions performed. Coronal reconstructions were performed of the noncontrast portion of the brain. Radiation dose length product (DLP) for this visit:  416 mGy-cm . This examination, like all CT scans performed in the [facility], was performed utilizing techniques to minimize radiation dose exposure, including the use of iterative reconstruction and automated exposure control. IV Contrast: 85 cc of Omnipaque 350 IMAGE QUALITY:   Diagnostic FINDINGS: CERVICAL VASCULATURE AORTIC ARCH AND GREAT VESSELS: Mild atherosclerotic disease of the arch, proximal great vessels and visualized subclavian vessels. No significant stenosis. There is variant branching anatomy of the great vessels with a common trunk for the brachiocephalic and left common carotid arteries, a so-called bovine aortic arch. RIGHT VERTEBRAL ARTERY CERVICAL SEGMENT:  Normal origin. The vessel is normal in caliber throughout the neck. LEFT VERTEBRAL ARTERY CERVICAL SEGMENT: Mild stenosis at the origin. The vessel is normal in caliber throughout the neck. The left vertebral artery is congenitally dominant. RIGHT EXTRACRANIAL CAROTID SEGMENT: Mild atherosclerotic disease of the distal common carotid artery and proximal cervical internal carotid artery without significant stenosis compared to the more distal ICA. LEFT EXTRACRANIAL CAROTID SEGMENT: Left common carotid artery is patent. Mild to moderate approximately 40 % stenosis left internal carotid artery origin. Distally the vessel is patent. NASCET criteria was used to determine the degree of internal carotid artery diameter stenosis. INTRACRANIAL VASCULATURE INTERNAL CAROTID ARTERIES:  Normal enhancement of the intracranial portions of the internal carotid arteries. Normal ophthalmic artery origins. Normal ICA terminus. ANTERIOR CIRCULATION:  Symmetric A1 segments and anterior cerebral arteries with normal enhancement. Normal anterior communicating artery. MIDDLE CEREBRAL ARTERY CIRCULATION:  M1 segment and middle cerebral artery branches demonstrate normal enhancement bilaterally. DISTAL VERTEBRAL ARTERIES:  Normal distal vertebral arteries. Posterior inferior cerebellar artery origins are normal. Normal vertebral basilar junction. BASILAR ARTERY:  Basilar artery is normal in caliber. Normal superior cerebellar arteries. POSTERIOR CEREBRAL ARTERIES: Both posterior cerebral arteries arises from the basilar tip. Both arteries demonstrate normal enhancement. Normal posterior communicating arteries. VENOUS STRUCTURES:  Normal. NON VASCULAR ANATOMY BONY STRUCTURES: Scattered mild spondylotic changes noted. SOFT TISSUES OF THE NECK:  Normal. THORACIC INLET:  Unremarkable. Impression: 1. No hemodynamically significant stenosis in the major arteries of the neck. 2.  No intracranial aneurysm or major intracranial arterial stenosis. 3.  No acute intracranial hemorrhage. I personally communicated the preliminary results of this study with Aurora Solis and Fortino Painter on 7/6/2023 2:38 PM. Workstation performed: [id number]     CT stroke alert brain    Result Date: 7/6/2023  Narrative: CT BRAIN - STROKE ALERT PROTOCOL INDICATION:   Stroke Alert. COMPARISON:  None. TECHNIQUE:  CT examination of the brain was performed. In addition to axial images, coronal reformatted images were created and submitted for interpretation. Radiation dose length product (DLP) for this visit:  866 mGy-cm . This examination, like all CT scans performed in the [facility], was performed utilizing techniques to minimize radiation dose exposure, including the use of iterative reconstruction and automated exposure control. IMAGE QUALITY:  Diagnostic. FINDINGS: PARENCHYMA:  No intracranial mass, mass effect or midline shift. No CT signs of acute infarction. No acute parenchymal hemorrhage.  Atherosclerotic calcifications of the cavernous segment of the internal carotid artery are mild. VENTRICLES AND EXTRA-AXIAL SPACES:  Normal for the patient's age. VISUALIZED ORBITS: Normal visualized orbits. PARANASAL SINUSES: Normal visualized paranasal sinuses. CALVARIUM AND EXTRACRANIAL SOFT TISSUES:   Normal.     Impression: No acute intracranial hemorrhage, mass effect or edema. Workstation performed: [id number]     EKG/Pathology/Other Studies:   Lab Results   Component Value Date    VENTRATE 70 07/06/2023    ATRIALRATE 70 07/06/2023    PRINT 164 07/06/2023    QRSDINT 90 07/06/2023    QTINT 390 07/06/2023    QTCINT 421 07/06/2023    PAXIS 82 07/06/2023    QRSAXIS 71 07/06/2023    TWAVEAXIS 56 07/06/2023        Code Status: Level 1 - Full Code  Advance Directive and Living Will:      Power of :    POLST:      Screenings:    1. Nutrition Screening  · Not available on chart    2. Pain Screening  Not available on chart    3. Suicide Screening  Not available on chart    Counseling / Coordination of Care: Total floor / unit time spent today 30 minutes. Greater than 50% of total time was spent with the patient and / or family counseling and / or coordination of care. A description of the counseling / coordination of care: Chart review, patient evaluation, coordination communication with staff, nursing and provider.

## 2023-07-07 NOTE — PLAN OF CARE
Problem: SAFETY, RESTRAINT - VIOLENT/SELF-DESTRUCTIVE  Goal: Remains free of harm/injury from restraints (Restraint for Violent/Self-Destructive Behavior)  Description: INTERVENTIONS:  - Instruct patient/family regarding restraint use   - Assess and monitor physiologic and psychological status   - Provide interventions and comfort measures to meet assessed patient needs   - Ensure continuous in person monitoring is provided   - Identify and implement measures to help patient regain control  - Assess readiness for release of restraint  Outcome: Progressing  Goal: Returns to optimal restraint-free functioning  Description: INTERVENTIONS:  - Assess the patient's behavior and symptoms that indicate continued need for restraint  - Identify and implement measures to help patient regain control  - Assess readiness for release of restraint   Outcome: Progressing     Problem: PAIN - ADULT  Goal: Verbalizes/displays adequate comfort level or baseline comfort level  Description: Interventions:  - Encourage patient to monitor pain and request assistance  - Assess pain using appropriate pain scale  - Administer analgesics based on type and severity of pain and evaluate response  - Implement non-pharmacological measures as appropriate and evaluate response  - Consider cultural and social influences on pain and pain management  - Notify physician/advanced practitioner if interventions unsuccessful or patient reports new pain  Outcome: Progressing     Problem: INFECTION - ADULT  Goal: Absence or prevention of progression during hospitalization  Description: INTERVENTIONS:  - Assess and monitor for signs and symptoms of infection  - Monitor lab/diagnostic results  - Monitor all insertion sites, i.e. indwelling lines, tubes, and drains  - Monitor endotracheal if appropriate and nasal secretions for changes in amount and color  - [place] appropriate cooling/warming therapies per order  - Administer medications as ordered  - Instruct and encourage patient and family to use good hand hygiene technique  - Identify and instruct in appropriate isolation precautions for identified infection/condition  Outcome: Progressing  Goal: Absence of fever/infection during neutropenic period  Description: INTERVENTIONS:  - Monitor WBC    Outcome: Progressing     Problem: SAFETY ADULT  Goal: Patient will remain free of falls  Description: INTERVENTIONS:  - Educate patient/family on patient safety including physical limitations  - Instruct patient to call for assistance with activity   - Consult OT/PT to assist with strengthening/mobility   - Keep Call bell within reach  - Keep bed low and locked with side rails adjusted as appropriate  - Keep care items and personal belongings within reach  - Initiate and maintain comfort rounds  - Make Fall Risk Sign visible to staff  - Offer Toileting every  Hours, in advance of need  - Initiate/Maintain alarm  - Obtain necessary fall risk management equipment:   - Apply yellow socks and bracelet for high fall risk patients  - Consider moving patient to room near nurses station  Outcome: Progressing  Goal: Maintain or return to baseline ADL function  Description: INTERVENTIONS:  -  Assess patient's ability to carry out ADLs; assess patient's baseline for ADL function and identify physical deficits which impact ability to perform ADLs (bathing, care of mouth/teeth, toileting, grooming, dressing, etc.)  - Assess/evaluate cause of self-care deficits   - Assess range of motion  - Assess patient's mobility; develop plan if impaired  - Assess patient's need for assistive devices and provide as appropriate  - Encourage maximum independence but intervene and supervise when necessary  - Involve family in performance of ADLs  - Assess for home care needs following discharge   - Consider OT consult to assist with ADL evaluation and planning for discharge  - Provide patient education as appropriate  Outcome: Progressing  Goal: Maintains/Returns to pre admission functional level  Description: INTERVENTIONS:  - Perform BMAT or MOVE assessment daily.   - Set and communicate daily mobility goal to care team and patient/family/caregiver. - Collaborate with rehabilitation services on mobility goals if consulted  - Perform Range of Motion  times a day. - Reposition patient every  hours. - Dangle patient  times a day  - Stand patient  times a day  - Ambulate patient  times a day  - Out of bed to chair  times a day   - Out of bed for meals  times a day  - Out of bed for toileting  - Record patient progress and toleration of activity level   Outcome: Progressing     Problem: DISCHARGE PLANNING  Goal: Discharge to home or other facility with appropriate resources  Description: INTERVENTIONS:  - Identify barriers to discharge w/patient and caregiver  - Arrange for needed discharge resources and transportation as appropriate  - Identify discharge learning needs (meds, wound care, etc.)  - Arrange for interpretive services to assist at discharge as needed  - Refer to Case Management Department for coordinating discharge planning if the patient needs post-hospital services based on physician/advanced practitioner order or complex needs related to functional status, cognitive ability, or social support system  Outcome: Progressing     Problem: Knowledge Deficit  Goal: Patient/family/caregiver demonstrates understanding of disease process, treatment plan, medications, and discharge instructions  Description: Complete learning assessment and assess knowledge base. Interventions:  - Provide teaching at level of understanding  - Provide teaching via preferred learning methods  Outcome: Progressing     Problem: Nutrition/Hydration-ADULT  Goal: Nutrient/Hydration intake appropriate for improving, restoring or maintaining nutritional needs  Description: Monitor and assess patient's nutrition/hydration status for malnutrition.  Collaborate with interdisciplinary team and initiate plan and interventions as ordered. Monitor patient's weight and dietary intake as ordered or per policy. Utilize nutrition screening tool and intervene as necessary. Determine patient's food preferences and provide high-protein, high-caloric foods as appropriate.      INTERVENTIONS:  - Monitor oral intake, urinary output, labs, and treatment plans  - Assess nutrition and hydration status and recommend course of action  - Evaluate amount of meals eaten  - Assist patient with eating if necessary   - Allow adequate time for meals  - Recommend/ encourage appropriate diets, oral nutritional supplements, and vitamin/mineral supplements  - Order, calculate, and assess calorie counts as needed  - Recommend, monitor, and adjust tube feedings and TPN/PPN based on assessed needs  - Assess need for intravenous fluids  - Provide specific nutrition/hydration education as appropriate  - Include patient/family/caregiver in decisions related to nutrition  Outcome: Progressing     Problem: MOBILITY - ADULT  Goal: Maintain or return to baseline ADL function  Description: INTERVENTIONS:  -  Assess patient's ability to carry out ADLs; assess patient's baseline for ADL function and identify physical deficits which impact ability to perform ADLs (bathing, care of mouth/teeth, toileting, grooming, dressing, etc.)  - Assess/evaluate cause of self-care deficits   - Assess range of motion  - Assess patient's mobility; develop plan if impaired  - Assess patient's need for assistive devices and provide as appropriate  - Encourage maximum independence but intervene and supervise when necessary  - Involve family in performance of ADLs  - Assess for home care needs following discharge   - Consider OT consult to assist with ADL evaluation and planning for discharge  - Provide patient education as appropriate  Outcome: Progressing  Goal: Maintains/Returns to pre admission functional level  Description: INTERVENTIONS:  - Perform BMAT or MOVE assessment daily.   - Set and communicate daily mobility goal to care team and patient/family/caregiver. - Collaborate with rehabilitation services on mobility goals if consulted  - Perform Range of Motion  times a day. - Reposition patient every  hours.   - Dangle patient  times a day  - Stand patient  times a day  - Ambulate patient  times a day  - Out of bed to chair  times a day   - Out of bed for meals  times a day  - Out of bed for toileting  - Record patient progress and toleration of activity level   Outcome: Progressing     Problem: SAFETY,RESTRAINT: NV/NON-SELF DESTRUCTIVE BEHAVIOR  Goal: Returns to optimal restraint-free functioning  Description: INTERVENTIONS:  - Assess the patient's behavior and symptoms that indicate continued need for restraint  - Identify and implement measures to help patient regain control  - Assess readiness for release of restraint   Outcome: Progressing  Goal: Remains free of harm/injury (restraint for non violent/non self-detsructive behavior)  Description: INTERVENTIONS:  - Instruct patient/family regarding restraint use   - Assess and monitor physiologic and psychological status   - Provide interventions and comfort measures to meet assessed patient needs   - Identify and implement measures to help patient regain control  - Assess readiness for release of restraint   Outcome: Progressing     Problem: Prexisting or High Potential for Compromised Skin Integrity  Goal: Skin integrity is maintained or improved  Description: INTERVENTIONS:  - Identify patients at risk for skin breakdown  - Assess and monitor skin integrity  - Assess and monitor nutrition and hydration status  - Monitor labs   - Assess for incontinence   - Turn and reposition patient  - Assist with mobility/ambulation  - Relieve pressure over bony prominences  - Avoid friction and shearing  - Provide appropriate hygiene as needed including keeping skin clean and dry  - Evaluate need for skin moisturizer/barrier cream  - Collaborate with interdisciplinary team   - Patient/family teaching  - Consider wound care consult   Outcome: Progressing

## 2023-07-07 NOTE — PROGRESS NOTES
[address] [facility]  Progress Note  Name: Maurice Lehman  MRN: [id number]  Unit/Bed#: -01 I Date of Admission: 7/6/2023   Date of Service: 7/7/2023  Hospital Day: 1    Assessment/Plan   GERD (gastroesophageal reflux disease)  Assessment & Plan  · Continue PPI    Hyperlipidemia  Assessment & Plan  · Continue statin    Hypertension  Assessment & Plan  · Normotensive here  · Can continue lisinopril 40 mg, carvedilol 25 mg BID, chlorthalidone 25 mg daily, doxazosin 1 mg daily and amlodipine 10 mg daily  · Resume home meds once taking PO/more alert, prn IV hydral for now    Seizure [facility])  Assessment & Plan  · Remote h/o seizure in 1971  · Seen by Neuro in 2019 at [address] [facility] for eval for recurrent seizure after episode of unresponsiveness and questionable post-ictal confusion at home   · EEG in 2019 normal, Neuro felt there was no concern for neurologic etiology  · Event was felt to be related to underlying psychiatric disorder vs psychiatric medication effect    Schizoaffective disorder, bipolar type ([address])  Assessment & Plan  · Extensive review of med rec, personally entered every medication with records from group home  · Pt maintained on IM Abilify Maintena ER every month, citalopram daily, clozapine hs, fluvoxaminehs, doxepin hs, lorazepam hs, lithium hs, olanzapine BID, Cogentin daily   · Also on Cogentin prn EPS  · Appreciate psych consult, likely overmedication and polypharmacy contributing to presentation  · Discussed with pharmacy and psych, will discontinue clozapine and fluvoxamine (fluvoxamine today increase clozapine levels by 50%). Decreased doxepin and Cogentin to reduce anticholinergic effects. · Recheck lithium level in a.m.   · Check clozapine level (send out test, expect results in 3-5 days)    * Encephalopathy  Assessment & Plan  · Reportedly dysarthric and encephalopathic per EMS en route to ED  · Narcan given en route  · Upon arrival became agitated in CT scanner, required IV Ativan and haldol and sedated in restraints  · CTH negative   · UDS negative  · Neuro consulted for possible stroke, no concern for stroke presently nor for seizure. Non focal neuro exam.  · Can consider MRI if no return to baseline mentation  · B12 WNL, TSH WNL, ETOH lvl WNL  · No concern for infectious etiology: no leukocytosis, afebrile, procal wnl, CXR clear, no urinary complaints, neg lactate  · Pt has previously been seen in [address] [facility] ED for similar presentation with decreased responsiveness  · EEG was normal, Neuro felt no neurologic cause at that time  · [place] to be psychiatric in nature  · Likewise feel this presentation may be more so related to psychiatric illness and psychotropic medications  · Pt is currently taking 8 psychotropic medications at high doses  · Lithium level was suprisingly low, pt is on 3x max dose -so he reports compliance. States that they also have a "mouth check" policy and would be surprising if patient was not taking his medications. · Discussed with psychiatry as well as psych pharmacy. Concern for clozapine toxicity vs anticholinergic toxicity               VTE Pharmacologic Prophylaxis: VTE Score: 5 High Risk (Score >/= 5) - Pharmacological DVT Prophylaxis Ordered: enoxaparin (Lovenox). Sequential Compression Devices Ordered. Patient Centered Rounds: I performed bedside rounds with nursing staff today. Discussions with Specialists or Other Care Team Provider: Psychiatry, pharmacy    Education and Discussions with Family / Patient: Discussed with Fernanda Donovan . Total Time Spent on Date of Encounter in care of patient: 55 minutes This time was spent on one or more of the following: performing physical exam; counseling and coordination of care; obtaining or reviewing history; documenting in the medical record; reviewing/ordering tests, medications or procedures; communicating with other healthcare professionals and discussing with patient's family/caregivers.     Current Length of Stay: 1 day(s)  Current Patient Status: Inpatient   Certification Statement: The patient will continue to require additional inpatient hospital stay due to Monitoring mental status  Discharge Plan: Anticipate discharge in 48-72 hrs to group home. Code Status: Level 1 - Full Code    Subjective:   Slight improvement since overnight. No longer on restraints. Objective:     Vitals:   Temp (24hrs), Av °F (36.7 °C), Min:97.6 °F (36.4 °C), Max:98.3 °F (36.8 °C)    Temp:  [97.6 °F (36.4 °C)-98.3 °F (36.8 °C)] 98 °F (36.7 °C)  HR:  [66-80] 69  Resp:  [16-18] 16  BP: ()/(60-73) 99/60  SpO2:  [93 %-98 %] 93 %  Body mass index is 29.26 kg/m². Input and Output Summary (last 24 hours): Intake/Output Summary (Last 24 hours) at 2023 1557  Last data filed at 2023 1539  Gross per 24 hour   Intake 1994.17 ml   Output 4350 ml   Net -2355.83 ml       Physical Exam:   Physical Exam  Vitals and nursing note reviewed. Constitutional:       Appearance: Normal appearance. HENT:      Head: Normocephalic and atraumatic. Mouth/Throat:      Mouth: Mucous membranes are moist.      Pharynx: Oropharynx is clear. No oropharyngeal exudate. Eyes:      Extraocular Movements: Extraocular movements intact. Cardiovascular:      Rate and Rhythm: Normal rate and regular rhythm. Pulses: Normal pulses. Heart sounds: Normal heart sounds. No murmur heard. No friction rub. No gallop. Pulmonary:      Effort: Pulmonary effort is normal. No respiratory distress. Breath sounds: Normal breath sounds. No stridor. No wheezing or rales. Abdominal:      General: Abdomen is flat. Bowel sounds are normal. There is no distension. Palpations: Abdomen is soft. Tenderness: There is no abdominal tenderness. Musculoskeletal:      Right lower leg: No edema. Left lower leg: No edema. Skin:     General: Skin is warm and dry. Neurological:      General: No focal deficit present. Mental Status: He is alert. Comments: No focal neurodeficits, able to follow commands. Mumbling, but no slurring of speech. Additional Data:     Labs:  Results from last 7 days   Lab Units 07/07/23  0420   WBC Thousand/uL 9.23   HEMOGLOBIN g/dL 13.7   HEMATOCRIT % 42.6   PLATELETS Thousands/uL 175   NEUTROS PCT % 81*   LYMPHS PCT % 10*   MONOS PCT % 8   EOS PCT % 1     Results from last 7 days   Lab Units 07/07/23  0420   SODIUM mmol/L 140   POTASSIUM mmol/L 3.7   CHLORIDE mmol/L 107   CO2 mmol/L 28   BUN mg/dL 18   CREATININE mg/dL 0.80   ANION GAP mmol/L 5   CALCIUM mg/dL 8.8   ALBUMIN g/dL 3.5   TOTAL BILIRUBIN mg/dL 0.49   ALK PHOS U/L 45   ALT U/L 25   AST U/L 18   GLUCOSE RANDOM mg/dL 91     Results from last 7 days   Lab Units 07/06/23  1324   INR  0.94     Results from last 7 days   Lab Units 07/06/23  1410   POC GLUCOSE mg/dl 89         Results from last 7 days   Lab Units 07/06/23  1408   LACTIC ACID mmol/L 1.5   PROCALCITONIN ng/ml 0.10       Lines/Drains:  Invasive Devices     Peripheral Intravenous Line  Duration           Peripheral IV 07/06/23 Dorsal (posterior); Right Hand 1 day    Peripheral IV 07/06/23 Left Antecubital 1 day          Drain  Duration           Urethral Catheter Straight-tip 16 Fr. <1 day              Urinary Catheter:  Goal for removal: Patient self removed Spencer catheter, monitor               Imaging: No pertinent imaging reviewed. Recent Cultures (last 7 days):   Results from last 7 days   Lab Units 07/06/23  1408   BLOOD CULTURE  Received in Microbiology Lab. Culture in Progress. Received in Microbiology Lab. Culture in Progress.        Last 24 Hours Medication List:   Current Facility-Administered Medications   Medication Dose Route Frequency Provider Last Rate   • amLODIPine  10 mg Oral Daily Rose Nichols PA-C     • atorvastatin  40 mg Oral Daily With Dinner Haven Callahan PA-C     • [START ON 7/8/2023] benztropine  0.5 mg Oral Daily Julienne Ardon JESSIKA Rider     • benztropine  1 mg Oral Q8H PRN Damaris Patel PA-C     • carvedilol  25 mg Oral BID With Meals Damaris Patel PA-C     • chlorthalidone  25 mg Oral Daily Damaris Patel PA-C     • doxazosin  1 mg Oral HS Damaris Patel PA-C     • doxepin  50 mg Oral HS Kareen Colon PA-C     • enoxaparin  40 mg Subcutaneous Q24H [facility] Jeanette Avila MD     • hydrALAZINE  5 mg Intravenous Q6H PRN Damaris Patel PA-C     • iohexol  85 mL Intravenous Once in imaging Damaris Patel PA-C     • lisinopril  40 mg Oral Daily Damaris Patel PA-C     • lithium carbonate  900 mg Oral HS Damaris Patel PA-C     • OLANZapine  20 mg Oral BID Damaris Patel PA-C     • pantoprazole  40 mg Oral Early Morning Damaris Patel PA-C     • pneumococcal 20-rohini conj vacc  0.5 mL Intramuscular Once Jeanette Avila MD     • sodium chloride  75 mL/hr Intravenous Continuous Damaris Patel PA-C 75 mL/hr (07/07/23 9836)        Today, Patient Was Seen By: Kareen Colon PA-C    **Please Note: This note may have been constructed using a voice recognition system. **

## 2023-07-07 NOTE — ASSESSMENT & PLAN NOTE
· Remote h/o seizure in 1971  · [facility] after episode of unresponsiveness and questionable post-ictal confusion at home   · EEG in 2019 normal, Neuro felt there was no concern for neurologic etiology  · Event was felt to be related to underlying psychiatric disorder vs psychiatric medication effect

## 2023-07-07 NOTE — PROGRESS NOTES
Pharmacy Consult:    Was consulted about this patient from staff pharmacist at [facility]. Pt is presenting with encephalopathy. He is on several psychiatric medications (which have been confirmed by PA from [facility] med list). Polypharmacy could be contributing to pt's presentation. Concern for potential clozapine toxicity causing encephalopathy, given pt is on clozapine + fluvoxamine. Fluvoxamine is a CY inhibitor and can increase clozapine concentrations up to 50%. Unknown how long pt has been on this combination. Recommend clozapine level to assess for toxicity (this is a send out lab and typically takes 3-5 days). The level at which clozapine toxicity occurs differs from pt to pt; but more commonly occurs at levels >1000. Recommend clozapine and fluvoxamine to be discontinued for now to see if could be contributing to encephalopathy. If encephalopathy does not subside within 5 days, consider adjustment of other psychiatric medications, as this could also be an anticholinergic delirium presentation. Reach out to me for any questions or concerns. Thank you for the consult.        Aubrey Carvajal, PharmD, BCPP  Clinical Pharmacist-Behavioral Health

## 2023-07-07 NOTE — ASSESSMENT & PLAN NOTE
· Reportedly dysarthric and encephalopathic per EMS en route to ED  · Narcan given en route  · Upon arrival became agitated in CT scanner, required IV Ativan and haldol and sedated in restraints  · CTH negative   · UDS negative  · Neuro consulted for possible stroke, no concern for stroke presently nor for seizure. Non focal neuro exam.  · Can consider MRI if no return to baseline mentation  · B12 WNL, TSH WNL, ETOH lvl WNL  · No concern for infectious etiology: no leukocytosis, afebrile, procal wnl, CXR clear, no urinary complaints, neg lactate  · Pt has previously been seen in [address] [facility] ED for similar presentation with decreased responsiveness  · EEG was normal, Neuro felt no neurologic cause at that time  · [place] to be psychiatric in nature  · Likewise feel this presentation may be more so related to psychiatric illness and psychotropic medications  · Pt is currently taking 8 psychotropic medications at high doses  · Lithium level was suprisingly low, pt is on 3x max dose -so he reports compliance. States that they also have a "mouth check" policy and would be surprising if patient was not taking his medications. · Discussed with psychiatry as well as psych pharmacy.   Concern for clozapine toxicity vs anticholinergic toxicity

## 2023-07-07 NOTE — PLAN OF CARE
Problem: INFECTION - ADULT  Goal: Absence or prevention of progression during hospitalization  Description: INTERVENTIONS:  - Assess and monitor for signs and symptoms of infection  - Monitor lab/diagnostic results  - Monitor all insertion sites, i.e. indwelling lines, tubes, and drains  - Monitor endotracheal if appropriate and nasal secretions for changes in amount and color  - [place] appropriate cooling/warming therapies per order  - Administer medications as ordered  - Instruct and encourage patient and family to use good hand hygiene technique  - Identify and instruct in appropriate isolation precautions for identified infection/condition  Outcome: Progressing  Goal: Absence of fever/infection during neutropenic period  Description: INTERVENTIONS:  - Monitor WBC    Outcome: Progressing     Problem: PAIN - ADULT  Goal: Verbalizes/displays adequate comfort level or baseline comfort level  Description: Interventions:  - Encourage patient to monitor pain and request assistance  - Assess pain using appropriate pain scale  - Administer analgesics based on type and severity of pain and evaluate response  - Implement non-pharmacological measures as appropriate and evaluate response  - Consider cultural and social influences on pain and pain management  - Notify physician/advanced practitioner if interventions unsuccessful or patient reports new pain  Outcome: Progressing

## 2023-07-07 NOTE — CASE MANAGEMENT
Case Management Assessment & Discharge Planning Note    Patient name Richmond Clayton  Location /-77 MRN [id number]  : 1955 Date 2023       Current Admission Date: 2023  Current Admission Diagnosis:Encephalopathy   Patient Active Problem List    Diagnosis Date Noted   • Schizoaffective disorder, bipolar type ([address]) 2023   • Obsessive compulsive disorder 2023   • Seizure ([address]) 2023   • Hypertension 2023   • Encephalopathy 2023   • Hyperlipidemia 2023   • GERD (gastroesophageal reflux disease) 2023      LOS (days): 1  Geometric Mean LOS (GMLOS) (days): 2.30  Days to GMLOS:1.4     OBJECTIVE:    Risk of Unplanned Readmission Score: 14.41      Current admission status: Inpatient    Preferred Pharmacy:   PATIENT/FAMILY REPORTS NO PREFERRED PHARMACY  No address on file      Primary Care Provider: No primary care provider on file. Primary Insurance: MEDICARE  Secondary Insurance: [facility]    ASSESSMENT:  Active Health Care Proxies    There are no active Health Care Proxies on file.        Patient Information  Admitted from[de-identified] Facility  Mental Status: Alert, Confused  During Assessment patient was accompanied by: Not accompanied during assessment  Assessment information provided by[de-identified] Other - please comment Bernice Richard at [facility])  Support Systems: [address] [place] [address] of Residence: [place]  Living Arrangements: Other (Comment) ([facility])    Activities of Daily Living Prior to Admission  Functional Status: Independent  Completes ADLs independently?: Yes  Ambulates independently?: Yes    Patient Information Continued  Income Source: Government aid  Does patient have prescription coverage?: Yes  Does patient have a history of Mental Health Diagnosis?: Yes (Schizoaffective disorder, bipolar)    Means of Transportation  Means of Transport to [facility][de-identified] Other (Comment) (staff from [employer])  Was application for public transport provided?: N/A      DISCHARGE DETAILS:    Additional Comments: Pt currently confused and in restraints. Spoke with Tammie Romero at [facility] who informed CM that Pt resides in personal care home and is independent. Tammie Romero wanted provider to call for medical update. Message given to [facility]. Attempted to call Pt's sister([place]: [phone]), left message. Anticipate return call. CM to follow and will keep Tammie Romero at [facility] updated on Pt's progress. No

## 2023-07-07 NOTE — RESTRAINT FACE TO FACE
Restraint Face to Face   Seth Oliva 77 y.o. male MRN: [id number]  Unit/Bed#: -01 Encounter: [id number]      Physical Evaluation - snoring with stable VS, managing secretions   Purpose for Restraints/ Seclusion High risk for self harm  Patient's reaction to the intervention sleeping  Patient's medical condition stable   Patient's Behavioral conditionsleeping   Restraints to be Continued plan to maintain RUE locked restrain only and change LLE to soft restraint. LUE and RLE are not in restraints on my evaluation. In 1 hour will assess ability to change RUE to soft restraint only.

## 2023-07-07 NOTE — ASSESSMENT & PLAN NOTE
· Extensive review of med rec, personally entered every medication with records from group home  · Pt maintained on IM Abilify Maintena ER every month, citalopram daily, clozapine hs, fluvoxaminehs, doxepin hs, lorazepam hs, lithium hs, olanzapine BID, Cogentin daily   · Also on Cogentin prn EPS  · Appreciate psych consult, likely overmedication and polypharmacy contributing to presentation  · Discussed with pharmacy and psych, will discontinue clozapine and fluvoxamine (fluvoxamine today increase clozapine levels by 50%). Decreased doxepin and Cogentin to reduce anticholinergic effects. · Recheck lithium level in a.m.   · Check clozapine level (send out test, expect results in 3-5 days)

## 2023-07-08 LAB
ANION GAP SERPL CALCULATED.3IONS-SCNC: 2 MMOL/L
BUN SERPL-MCNC: 23 MG/DL (ref 5–25)
CALCIUM SERPL-MCNC: 8.4 MG/DL (ref 8.4–10.2)
CHLORIDE SERPL-SCNC: 108 MMOL/L (ref 96–108)
CO2 SERPL-SCNC: 28 MMOL/L (ref 21–32)
CREAT SERPL-MCNC: 0.99 MG/DL (ref 0.6–1.3)
ERYTHROCYTE [DISTWIDTH] IN BLOOD BY AUTOMATED COUNT: 14 % (ref 11.6–15.1)
GFR SERPL CREATININE-BSD FRML MDRD: 76 ML/MIN/1.73SQ M
GLUCOSE SERPL-MCNC: 105 MG/DL (ref 65–140)
HCT VFR BLD AUTO: 42.6 % (ref 36.5–49.3)
HGB BLD-MCNC: 13.3 G/DL (ref 12–17)
LITHIUM SERPL-SCNC: 0.7 MMOL/L (ref 0.6–1.2)
MCH RBC QN AUTO: 30.8 PG (ref 26.8–34.3)
MCHC RBC AUTO-ENTMCNC: 31.2 G/DL (ref 31.4–37.4)
MCV RBC AUTO: 99 FL (ref 82–98)
PLATELET # BLD AUTO: 185 THOUSANDS/UL (ref 149–390)
PMV BLD AUTO: 10.1 FL (ref 8.9–12.7)
POTASSIUM SERPL-SCNC: 4.4 MMOL/L (ref 3.5–5.3)
RBC # BLD AUTO: 4.32 MILLION/UL (ref 3.88–5.62)
SODIUM SERPL-SCNC: 138 MMOL/L (ref 135–147)
WBC # BLD AUTO: 6.11 THOUSAND/UL (ref 4.31–10.16)

## 2023-07-08 PROCEDURE — 90677 PCV20 VACCINE IM: CPT | Performed by: INTERNAL MEDICINE

## 2023-07-08 PROCEDURE — 80048 BASIC METABOLIC PNL TOTAL CA: CPT | Performed by: PHYSICIAN ASSISTANT

## 2023-07-08 PROCEDURE — 99232 SBSQ HOSP IP/OBS MODERATE 35: CPT | Performed by: PHYSICIAN ASSISTANT

## 2023-07-08 PROCEDURE — 80178 ASSAY OF LITHIUM: CPT | Performed by: PHYSICIAN ASSISTANT

## 2023-07-08 PROCEDURE — 85027 COMPLETE CBC AUTOMATED: CPT | Performed by: PHYSICIAN ASSISTANT

## 2023-07-08 RX ORDER — AMLODIPINE BESYLATE 5 MG/1
10 TABLET ORAL DAILY
Status: DISCONTINUED | OUTPATIENT
Start: 2023-07-08 | End: 2023-07-11 | Stop reason: HOSPADM

## 2023-07-08 RX ORDER — CHLORTHALIDONE 25 MG/1
25 TABLET ORAL DAILY
Status: DISCONTINUED | OUTPATIENT
Start: 2023-07-08 | End: 2023-07-11 | Stop reason: HOSPADM

## 2023-07-08 RX ADMIN — SODIUM CHLORIDE 75 ML/HR: 0.9 INJECTION, SOLUTION INTRAVENOUS at 19:17

## 2023-07-08 RX ADMIN — AMLODIPINE BESYLATE 10 MG: 5 TABLET ORAL at 09:47

## 2023-07-08 RX ADMIN — ATORVASTATIN CALCIUM 40 MG: 40 TABLET, FILM COATED ORAL at 17:16

## 2023-07-08 RX ADMIN — ENOXAPARIN SODIUM 40 MG: 100 INJECTION SUBCUTANEOUS at 09:49

## 2023-07-08 RX ADMIN — LISINOPRIL 40 MG: 20 TABLET ORAL at 09:47

## 2023-07-08 RX ADMIN — SODIUM CHLORIDE 75 ML/HR: 0.9 INJECTION, SOLUTION INTRAVENOUS at 06:17

## 2023-07-08 RX ADMIN — PANTOPRAZOLE SODIUM 40 MG: 40 TABLET, DELAYED RELEASE ORAL at 06:13

## 2023-07-08 RX ADMIN — CARVEDILOL 25 MG: 12.5 TABLET, FILM COATED ORAL at 17:16

## 2023-07-08 RX ADMIN — CHLORTHALIDONE 25 MG: 25 TABLET ORAL at 09:49

## 2023-07-08 RX ADMIN — DOXEPIN HYDROCHLORIDE 50 MG: 50 CAPSULE ORAL at 22:18

## 2023-07-08 RX ADMIN — PNEUMOCOCCAL 20-VALENT CONJUGATE VACCINE 0.5 ML
2.2; 2.2; 2.2; 2.2; 2.2; 2.2; 2.2; 2.2; 2.2; 2.2; 2.2; 2.2; 2.2; 2.2; 2.2; 2.2; 4.4; 2.2; 2.2; 2.2 INJECTION, SUSPENSION INTRAMUSCULAR at 06:13

## 2023-07-08 RX ADMIN — CARVEDILOL 25 MG: 12.5 TABLET, FILM COATED ORAL at 09:47

## 2023-07-08 RX ADMIN — DOXAZOSIN 1 MG: 1 TABLET ORAL at 22:18

## 2023-07-08 RX ADMIN — BENZTROPINE MESYLATE 0.5 MG: 1 TABLET ORAL at 09:47

## 2023-07-08 RX ADMIN — OLANZAPINE 20 MG: 10 TABLET, FILM COATED ORAL at 17:15

## 2023-07-08 RX ADMIN — LITHIUM CARBONATE 900 MG: 300 TABLET, EXTENDED RELEASE ORAL at 22:18

## 2023-07-08 RX ADMIN — OLANZAPINE 20 MG: 10 TABLET, FILM COATED ORAL at 09:49

## 2023-07-08 NOTE — ASSESSMENT & PLAN NOTE
· Reportedly dysarthric and encephalopathic per EMS en route to ED  · Narcan given en route  · Upon arrival became agitated in CT scanner, required IV Ativan and haldol and sedated in restraints  · CTH negative   · UDS negative  · Neuro consulted for possible stroke, no concern for stroke presently nor for seizure. Non focal neuro exam.  · Can consider MRI if no return to baseline mentation  · B12 WNL, TSH WNL, ETOH lvl WNL  · No concern for infectious etiology: no leukocytosis, afebrile, procal wnl, CXR clear, no urinary complaints, neg lactate  · Pt has previously been seen in [address] [facility] ED for similar presentation with decreased responsiveness  · EEG was normal, Neuro felt no neurologic cause at that time  · [place] to be psychiatric in nature  · Likewise feel this presentation may be more so related to psychiatric illness and psychotropic medications  · Pt is currently taking 8 psychotropic medications at high doses  · Lithium level was suprisingly low, pt is on 3x max dose -so he reports compliance. States that they also have a "mouth check" policy and would be surprising if patient was not taking his medications. · Discussed with psychiatry as well as psych pharmacy. Concern for clozapine toxicity vs anticholinergic toxicity  · Improving, alert with mumbled speech (mumbles at baseline). AAOx 2 to person and date, not to place. Continue to hold antipsychotics/anticholinergics.

## 2023-07-08 NOTE — PROGRESS NOTES
[facility]  Progress Note  Name: Gildardo Rico  MRN: [id number]  Unit/Bed#: -01 I Date of Admission: 7/6/2023   Date of Service: 7/8/2023 I Hospital Day: 2    Assessment/Plan   * Encephalopathy  Assessment & Plan  · Reportedly dysarthric and encephalopathic per EMS en route to ED  · Narcan given en route  · Upon arrival became agitated in CT scanner, required IV Ativan and haldol and sedated in restraints  · CTH negative   · UDS negative  · Neuro consulted for possible stroke, no concern for stroke presently nor for seizure. Non focal neuro exam.  · Can consider MRI if no return to baseline mentation  · B12 WNL, TSH WNL, ETOH lvl WNL  · No concern for infectious etiology: no leukocytosis, afebrile, procal wnl, CXR clear, no urinary complaints, neg lactate  · Pt has previously been seen in [address] [place] [address] ED for similar presentation with decreased responsiveness  · EEG was normal, Neuro felt no neurologic cause at that time  · [place] to be psychiatric in nature  · Likewise feel this presentation may be more so related to psychiatric illness and psychotropic medications  · Pt is currently taking 8 psychotropic medications at high doses  · Lithium level was suprisingly low, pt is on 3x max dose -so he reports compliance. States that they also have a "mouth check" policy and would be surprising if patient was not taking his medications. · Discussed with psychiatry as well as psych pharmacy. Concern for clozapine toxicity vs anticholinergic toxicity  · Improving, alert with mumbled speech (mumbles at baseline). AAOx 2 to person and date, not to place. Continue to hold antipsychotics/anticholinergics.     Schizoaffective disorder, bipolar type ([address])  Assessment & Plan  · Extensive review of med rec, personally entered every medication with records from group home  · Pt maintained on IM Abilify Maintena ER every month, citalopram daily, clozapine hs, fluvoxaminehs, doxepin hs, lorazepam hs, lithium hs, olanzapine BID, Cogentin daily   · Also on Cogentin prn EPS  · Appreciate psych consult, likely overmedication and polypharmacy contributing to presentation  · Discussed with pharmacy and psych, will discontinue clozapine and fluvoxamine (fluvoxamine today increase clozapine levels by 50%). Decreased doxepin and Cogentin to reduce anticholinergic effects. · Repeat lithium levels WNL  · Check clozapine level (send out test, expect results in 3-5 days)    GERD (gastroesophageal reflux disease)  Assessment & Plan  · Continue PPI    Hyperlipidemia  Assessment & Plan  · Continue statin    Hypertension  Assessment & Plan  · Normotensive here  · Can continue lisinopril 40 mg, carvedilol 25 mg BID, chlorthalidone 25 mg daily, doxazosin 1 mg daily and amlodipine 10 mg daily  · Resume home meds once taking PO/more alert, prn IV hydral for now    Seizure [facility])  Assessment & Plan  · Remote h/o seizure in 1971  · Seen by Neuro in 2019 at [address] [facility] for eval for recurrent seizure after episode of unresponsiveness and questionable post-ictal confusion at home   · EEG in 2019 normal, Neuro felt there was no concern for neurologic etiology  · Event was felt to be related to underlying psychiatric disorder vs psychiatric medication effect             VTE Pharmacologic Prophylaxis: VTE Score: 5 High Risk (Score >/= 5) - Pharmacological DVT Prophylaxis Ordered: enoxaparin (Lovenox). Sequential Compression Devices Ordered. Patient Centered Rounds: I performed bedside rounds with nursing staff today. Discussions with Specialists or Other Care Team Provider: Psych    Education and Discussions with Family / Patient: Attempted to update  (sister) via phone. Left voicemail.      Total Time Spent on Date of Encounter in care of patient: 45 minutes This time was spent on one or more of the following: performing physical exam; counseling and coordination of care; obtaining or reviewing history; documenting in the medical record; reviewing/ordering tests, medications or procedures; communicating with other healthcare professionals and discussing with patient's family/caregivers. Current Length of Stay: 2 day(s)  Current Patient Status: Inpatient   Certification Statement: The patient will continue to require additional inpatient hospital stay due to Antipsychotic washout  Discharge Plan: Anticipate discharge in 48 hrs to group home. Code Status: Level 1 - Full Code    Subjective:   Patient is more alert today. AAO x2 to person and time, however not to place. Is able to provide me with correct name of 1:1 at group home. Objective:     Vitals:   Temp (24hrs), Av.7 °F (36.5 °C), Min:97.1 °F (36.2 °C), Max:98 °F (36.7 °C)    Temp:  [97.1 °F (36.2 °C)-98 °F (36.7 °C)] 98 °F (36.7 °C)  HR:  [63-69] 63  Resp:  [16] 16  BP: ()/(60-71) 117/71  SpO2:  [93 %-95 %] 94 %  Body mass index is 29.26 kg/m². Input and Output Summary (last 24 hours): Intake/Output Summary (Last 24 hours) at 2023 1449  Last data filed at 2023 1400  Gross per 24 hour   Intake 2680 ml   Output 1675 ml   Net 1005 ml       Physical Exam:   Physical Exam  Vitals and nursing note reviewed. Constitutional:       Appearance: Normal appearance. HENT:      Head: Normocephalic and atraumatic. Mouth/Throat:      Mouth: Mucous membranes are moist.      Pharynx: Oropharynx is clear. No oropharyngeal exudate. Eyes:      Extraocular Movements: Extraocular movements intact. Cardiovascular:      Rate and Rhythm: Normal rate and regular rhythm. Pulses: Normal pulses. Heart sounds: Normal heart sounds. No murmur heard. No friction rub. No gallop. Pulmonary:      Effort: Pulmonary effort is normal. No respiratory distress. Breath sounds: Normal breath sounds. No stridor. No wheezing or rales. Abdominal:      General: Abdomen is flat. Bowel sounds are normal. There is no distension. Palpations: Abdomen is soft. Tenderness: There is no abdominal tenderness. Musculoskeletal:      Right lower leg: No edema. Left lower leg: No edema. Skin:     General: Skin is warm and dry. Neurological:      General: No focal deficit present. Mental Status: He is alert. Additional Data:     Labs:  Results from last 7 days   Lab Units 23  0602 23  0420   WBC Thousand/uL 6.11 9.23   HEMOGLOBIN g/dL 13.3 13.7   HEMATOCRIT % 42.6 42.6   PLATELETS Thousands/uL 185 175   NEUTROS PCT %  --  81*   LYMPHS PCT %  --  10*   MONOS PCT %  --  8   EOS PCT %  --  1     Results from last 7 days   Lab Units 23  0602 23  0420   SODIUM mmol/L 138 140   POTASSIUM mmol/L 4.4 3.7   CHLORIDE mmol/L 108 107   CO2 mmol/L 28 28   BUN mg/dL 23 18   CREATININE mg/dL 0.99 0.80   ANION GAP mmol/L 2 5   CALCIUM mg/dL 8.4 8.8   ALBUMIN g/dL  --  3.5   TOTAL BILIRUBIN mg/dL  --  0.49   ALK PHOS U/L  --  45   ALT U/L  --  25   AST U/L  --  18   GLUCOSE RANDOM mg/dL 105 91     Results from last 7 days   Lab Units 23  1324   INR  0.94     Results from last 7 days   Lab Units 23  1410   POC GLUCOSE mg/dl 89         Results from last 7 days   Lab Units 23  1408   LACTIC ACID mmol/L 1.5   PROCALCITONIN ng/ml 0.10       Lines/Drains:  Invasive Devices     Peripheral Intravenous Line  Duration           Peripheral IV 23 Dorsal (posterior); Right Hand 2 days                      Imaging: No pertinent imaging reviewed. Recent Cultures (last 7 days):   Results from last 7 days   Lab Units 23  1408   BLOOD CULTURE  No Growth at 24 hrs. No Growth at 24 hrs.        Last 24 Hours Medication List:   Current Facility-Administered Medications   Medication Dose Route Frequency Provider Last Rate   • amLODIPine  10 mg Oral Daily Jessica March MD     • atorvastatin  40 mg Oral Daily With Dinner Emir Alas PA-C     • benztropine  0.5 mg Oral Daily Sheryl Skinner PA-C     • benztropine  1 mg Oral Q8H PRN Gem Lake PA-C     • carvedilol  25 mg Oral BID With Meals Gem Lake PA-C     • chlorthalidone  25 mg Oral Daily Zaynab Baptiste MD     • doxazosin  1 mg Oral HS Gme Lake PA-C     • doxepin  50 mg Oral HS Celi Brumfield PA-C     • enoxaparin  40 mg Subcutaneous Q24H [facility] Radha Fermin MD     • hydrALAZINE  5 mg Intravenous Q6H PRN Gem Lake PA-C     • iohexol  85 mL Intravenous Once in imaging Gem Lake PA-C     • lisinopril  40 mg Oral Daily Gem Lake PA-C     • lithium carbonate  900 mg Oral HS Gem Lake PA-C     • OLANZapine  20 mg Oral BID Gem Lake PA-C     • pantoprazole  40 mg Oral Early Morning Gem Lake PA-C     • sodium chloride  75 mL/hr Intravenous Continuous Gem Lake PA-C 75 mL/hr (07/08/23 0617)        Today, Patient Was Seen By: Celi Brumfield PA-C    **Please Note: This note may have been constructed using a voice recognition system. **

## 2023-07-08 NOTE — PLAN OF CARE
Problem: PAIN - ADULT  Goal: Verbalizes/displays adequate comfort level or baseline comfort level  Description: Interventions:  - Encourage patient to monitor pain and request assistance  - Assess pain using appropriate pain scale  - Administer analgesics based on type and severity of pain and evaluate response  - Implement non-pharmacological measures as appropriate and evaluate response  - Consider cultural and social influences on pain and pain management  - Notify physician/advanced practitioner if interventions unsuccessful or patient reports new pain  Outcome: Progressing     Problem: INFECTION - ADULT  Goal: Absence or prevention of progression during hospitalization  Description: INTERVENTIONS:  - Assess and monitor for signs and symptoms of infection  - Monitor lab/diagnostic results  - Monitor all insertion sites, i.e. indwelling lines, tubes, and drains  - Monitor endotracheal if appropriate and nasal secretions for changes in amount and color  - [place] appropriate cooling/warming therapies per order  - Administer medications as ordered  - Instruct and encourage patient and family to use good hand hygiene technique  - Identify and instruct in appropriate isolation precautions for identified infection/condition  Outcome: Progressing  Goal: Absence of fever/infection during neutropenic period  Description: INTERVENTIONS:  - Monitor WBC    Outcome: Progressing     Problem: SAFETY ADULT  Goal: Patient will remain free of falls  Description: INTERVENTIONS:  - Educate patient/family on patient safety including physical limitations  - Instruct patient to call for assistance with activity   - Consult OT/PT to assist with strengthening/mobility   - Keep Call bell within reach  - Keep bed low and locked with side rails adjusted as appropriate  - Keep care items and personal belongings within reach  - Initiate and maintain comfort rounds  - Make Fall Risk Sign visible to staff  - Offer Toileting every 2 Hours, in advance of need  - Initiate/Maintain bed alarm  - Apply yellow socks and bracelet for high fall risk patients  - Consider moving patient to room near nurses station  Outcome: Progressing  Goal: Maintain or return to baseline ADL function  Description: INTERVENTIONS:  -  Assess patient's ability to carry out ADLs; assess patient's baseline for ADL function and identify physical deficits which impact ability to perform ADLs (bathing, care of mouth/teeth, toileting, grooming, dressing, etc.)  - Assess/evaluate cause of self-care deficits   - Assess range of motion  - Assess patient's mobility; develop plan if impaired  - Assess patient's need for assistive devices and provide as appropriate  - Encourage maximum independence but intervene and supervise when necessary  - Involve family in performance of ADLs  - Assess for home care needs following discharge   - Consider OT consult to assist with ADL evaluation and planning for discharge  - Provide patient education as appropriate  Outcome: Progressing  Goal: Maintains/Returns to pre admission functional level  Description: INTERVENTIONS:  - Perform BMAT or MOVE assessment daily.   - Set and communicate daily mobility goal to care team and patient/family/caregiver. - Collaborate with rehabilitation services on mobility goals if consulted  - Perform Range of Motion 3 times a day. - Reposition patient every 2 hours.   - Dangle patient 3 times a day  - Stand patient 3 times a day  - Ambulate patient 3 times a day  - Out of bed to chair 3 times a day   - Out of bed for meals 3 times a day  - Out of bed for toileting  - Record patient progress and toleration of activity level   Outcome: Progressing     Problem: DISCHARGE PLANNING  Goal: Discharge to home or other facility with appropriate resources  Description: INTERVENTIONS:  - Identify barriers to discharge w/patient and caregiver  - Arrange for needed discharge resources and transportation as appropriate  - Identify discharge learning needs (meds, wound care, etc.)  - Arrange for interpretive services to assist at discharge as needed  - Refer to Case Management Department for coordinating discharge planning if the patient needs post-hospital services based on physician/advanced practitioner order or complex needs related to functional status, cognitive ability, or social support system  Outcome: Progressing     Problem: Knowledge Deficit  Goal: Patient/family/caregiver demonstrates understanding of disease process, treatment plan, medications, and discharge instructions  Description: Complete learning assessment and assess knowledge base. Interventions:  - Provide teaching at level of understanding  - Provide teaching via preferred learning methods  Outcome: Progressing     Problem: Nutrition/Hydration-ADULT  Goal: Nutrient/Hydration intake appropriate for improving, restoring or maintaining nutritional needs  Description: Monitor and assess patient's nutrition/hydration status for malnutrition. Collaborate with interdisciplinary team and initiate plan and interventions as ordered. Monitor patient's weight and dietary intake as ordered or per policy. Utilize nutrition screening tool and intervene as necessary. Determine patient's food preferences and provide high-protein, high-caloric foods as appropriate.      INTERVENTIONS:  - Monitor oral intake, urinary output, labs, and treatment plans  - Assess nutrition and hydration status and recommend course of action  - Evaluate amount of meals eaten  - Assist patient with eating if necessary   - Allow adequate time for meals  - Recommend/ encourage appropriate diets, oral nutritional supplements, and vitamin/mineral supplements  - Order, calculate, and assess calorie counts as needed  - Recommend, monitor, and adjust tube feedings and TPN/PPN based on assessed needs  - Assess need for intravenous fluids  - Provide specific nutrition/hydration education as appropriate  - Include patient/family/caregiver in decisions related to nutrition  Outcome: Progressing     Problem: MOBILITY - ADULT  Goal: Maintain or return to baseline ADL function  Description: INTERVENTIONS:  -  Assess patient's ability to carry out ADLs; assess patient's baseline for ADL function and identify physical deficits which impact ability to perform ADLs (bathing, care of mouth/teeth, toileting, grooming, dressing, etc.)  - Assess/evaluate cause of self-care deficits   - Assess range of motion  - Assess patient's mobility; develop plan if impaired  - Assess patient's need for assistive devices and provide as appropriate  - Encourage maximum independence but intervene and supervise when necessary  - Involve family in performance of ADLs  - Assess for home care needs following discharge   - Consider OT consult to assist with ADL evaluation and planning for discharge  - Provide patient education as appropriate  Outcome: Progressing  Goal: Maintains/Returns to pre admission functional level  Description: INTERVENTIONS:  - Perform BMAT or MOVE assessment daily.   - Set and communicate daily mobility goal to care team and patient/family/caregiver. - Collaborate with rehabilitation services on mobility goals if consulted  - Perform Range of Motion 3 times a day. - Reposition patient every 2 hours.   - Dangle patient 3 times a day  - Stand patient 3 times a day  - Ambulate patient 3 times a day  - Out of bed to chair 3 times a day   - Out of bed for meals 3 times a day  - Out of bed for toileting  - Record patient progress and toleration of activity level   Outcome: Progressing     Problem: SAFETY,RESTRAINT: NV/NON-SELF DESTRUCTIVE BEHAVIOR  Goal: Remains free of harm/injury (restraint for non violent/non self-detsructive behavior)  Description: INTERVENTIONS:  - Instruct patient/family regarding restraint use   - Assess and monitor physiologic and psychological status   - Provide interventions and comfort measures to meet assessed patient needs   - Identify and implement measures to help patient regain control  - Assess readiness for release of restraint   Outcome: Progressing     Problem: Prexisting or High Potential for Compromised Skin Integrity  Goal: Skin integrity is maintained or improved  Description: INTERVENTIONS:  - Identify patients at risk for skin breakdown  - Assess and monitor skin integrity  - Assess and monitor nutrition and hydration status  - Monitor labs   - Assess for incontinence   - Turn and reposition patient  - Assist with mobility/ambulation  - Relieve pressure over bony prominences  - Avoid friction and shearing  - Provide appropriate hygiene as needed including keeping skin clean and dry  - Evaluate need for skin moisturizer/barrier cream  - Collaborate with interdisciplinary team   - Patient/family teaching  - Consider wound care consult   Outcome: Progressing     Problem: NEUROSENSORY - ADULT  Goal: Achieves stable or improved neurological status  Description: INTERVENTIONS  - Monitor and report changes in neurological status  - Monitor vital signs such as temperature, blood pressure, glucose, and any other labs ordered   - Initiate measures to prevent increased intracranial pressure  - Monitor for seizure activity and implement precautions if appropriate      Outcome: Progressing  Goal: Achieves maximal functionality and self care  Description: INTERVENTIONS  - Monitor swallowing and airway patency with patient fatigue and changes in neurological status  - Encourage and assist patient to increase activity and self care.    - Encourage visually impaired, hearing impaired and aphasic patients to use assistive/communication devices  Outcome: Progressing

## 2023-07-08 NOTE — ASSESSMENT & PLAN NOTE
· Remote h/o seizure in 1971  · [facility] after episode of unresponsiveness and questionable post-ictal confusion at home   · EEG in 2019 normal, Neuro felt there was no concern for neurologic etiology  · Event was felt to be related to underlying psychiatric disorder vs psychiatric medication effect

## 2023-07-09 LAB
ANION GAP SERPL CALCULATED.3IONS-SCNC: 4 MMOL/L
BUN SERPL-MCNC: 17 MG/DL (ref 5–25)
CALCIUM SERPL-MCNC: 9 MG/DL (ref 8.4–10.2)
CHLORIDE SERPL-SCNC: 108 MMOL/L (ref 96–108)
CO2 SERPL-SCNC: 25 MMOL/L (ref 21–32)
CREAT SERPL-MCNC: 0.81 MG/DL (ref 0.6–1.3)
ERYTHROCYTE [DISTWIDTH] IN BLOOD BY AUTOMATED COUNT: 13.7 % (ref 11.6–15.1)
GFR SERPL CREATININE-BSD FRML MDRD: 90 ML/MIN/1.73SQ M
GLUCOSE SERPL-MCNC: 119 MG/DL (ref 65–140)
HCT VFR BLD AUTO: 45.8 % (ref 36.5–49.3)
HGB BLD-MCNC: 13.9 G/DL (ref 12–17)
MCH RBC QN AUTO: 31.2 PG (ref 26.8–34.3)
MCHC RBC AUTO-ENTMCNC: 30.3 G/DL (ref 31.4–37.4)
MCV RBC AUTO: 103 FL (ref 82–98)
PLATELET # BLD AUTO: 156 THOUSANDS/UL (ref 149–390)
PMV BLD AUTO: 10.3 FL (ref 8.9–12.7)
POTASSIUM SERPL-SCNC: 4.5 MMOL/L (ref 3.5–5.3)
RBC # BLD AUTO: 4.46 MILLION/UL (ref 3.88–5.62)
SODIUM SERPL-SCNC: 137 MMOL/L (ref 135–147)
WBC # BLD AUTO: 8.82 THOUSAND/UL (ref 4.31–10.16)

## 2023-07-09 PROCEDURE — 85027 COMPLETE CBC AUTOMATED: CPT | Performed by: INTERNAL MEDICINE

## 2023-07-09 PROCEDURE — 80048 BASIC METABOLIC PNL TOTAL CA: CPT | Performed by: INTERNAL MEDICINE

## 2023-07-09 PROCEDURE — 99232 SBSQ HOSP IP/OBS MODERATE 35: CPT | Performed by: PHYSICIAN ASSISTANT

## 2023-07-09 RX ADMIN — LITHIUM CARBONATE 900 MG: 300 TABLET, EXTENDED RELEASE ORAL at 21:06

## 2023-07-09 RX ADMIN — BENZTROPINE MESYLATE 0.5 MG: 1 TABLET ORAL at 08:06

## 2023-07-09 RX ADMIN — ENOXAPARIN SODIUM 40 MG: 100 INJECTION SUBCUTANEOUS at 08:06

## 2023-07-09 RX ADMIN — AMLODIPINE BESYLATE 10 MG: 5 TABLET ORAL at 08:05

## 2023-07-09 RX ADMIN — OLANZAPINE 20 MG: 10 TABLET, FILM COATED ORAL at 17:32

## 2023-07-09 RX ADMIN — CARVEDILOL 25 MG: 12.5 TABLET, FILM COATED ORAL at 08:06

## 2023-07-09 RX ADMIN — CHLORTHALIDONE 25 MG: 25 TABLET ORAL at 08:15

## 2023-07-09 RX ADMIN — ATORVASTATIN CALCIUM 40 MG: 40 TABLET, FILM COATED ORAL at 17:21

## 2023-07-09 RX ADMIN — OLANZAPINE 20 MG: 10 TABLET, FILM COATED ORAL at 08:15

## 2023-07-09 RX ADMIN — DOXEPIN HYDROCHLORIDE 50 MG: 50 CAPSULE ORAL at 21:06

## 2023-07-09 RX ADMIN — PANTOPRAZOLE SODIUM 40 MG: 40 TABLET, DELAYED RELEASE ORAL at 05:07

## 2023-07-09 RX ADMIN — CARVEDILOL 25 MG: 12.5 TABLET, FILM COATED ORAL at 17:20

## 2023-07-09 RX ADMIN — DOXAZOSIN 1 MG: 1 TABLET ORAL at 21:06

## 2023-07-09 RX ADMIN — LISINOPRIL 40 MG: 20 TABLET ORAL at 08:06

## 2023-07-09 NOTE — ASSESSMENT & PLAN NOTE
· Reportedly dysarthric and encephalopathic per EMS en route to ED  · Narcan given en route  · Upon arrival became agitated in CT scanner, required IV Ativan and haldol and sedated in restraints  · CTH negative   · UDS negative  · Neuro consulted for possible stroke, no concern for stroke presently nor for seizure. Non focal neuro exam.  · Can consider MRI if no return to baseline mentation  · B12 WNL, TSH WNL, ETOH lvl WNL  · No concern for infectious etiology: no leukocytosis, afebrile, procal wnl, CXR clear, no urinary complaints, neg lactate  · Pt has previously been seen in [address] [facility] ED for similar presentation with decreased responsiveness  · EEG was normal, Neuro felt no neurologic cause at that time  · [place] to be psychiatric in nature  · Likewise feel this presentation may be more so related to psychiatric illness and psychotropic medications  · Pt is currently taking 8 psychotropic medications at high doses  · Lithium level was suprisingly low, pt is on 3x max dose -so he reports compliance. States that they also have a "mouth check" policy and would be surprising if patient was not taking his medications. · Discussed with psychiatry as well as psych pharmacy. Concern for clozapine toxicity vs anticholinergic toxicity  · Improving, alert with mumbled speech (mumbles at baseline). AAOx 2 to person and date, not to place. Continue to hold antipsychotics/anticholinergics. · Medication changes include:  · Clozapine 200mg - discontinued  · Fluvoxamine 300mg - discontinued  · Celexa 20mg - discontinued  · Cogentin 1mg - reduced to 0.5mg  · Doxepin 100mg - reduced to 50mg  · Zyprexa 20mg BID - continue at current dose  · Cardura 1mg - continue at current dose  · Patient has been stable from a psychiatric standpoint. Would continue on current regimen and reintroduce medications one a time as outpatient if needed, starting with SSRI.

## 2023-07-09 NOTE — ASSESSMENT & PLAN NOTE
· Remote h/o seizure in 1971  · [facility] after episode of unresponsiveness and questionable post-ictal confusion at home   · EEG in 2019 normal, Neuro felt there was no concern for neurologic etiology  · Event was felt to be related to underlying psychiatric disorder vs psychiatric medication effect

## 2023-07-09 NOTE — ASSESSMENT & PLAN NOTE
· Normotensive here  · Continue lisinopril 40 mg, carvedilol 25 mg BID, chlorthalidone 25 mg daily, doxazosin 1 mg daily and amlodipine 10 mg daily

## 2023-07-09 NOTE — ASSESSMENT & PLAN NOTE
· Extensive review of med rec, personally entered every medication with records from group home  · Pt maintained on IM Abilify Maintena ER every month, citalopram daily, clozapine hs, fluvoxaminehs, doxepin hs, lorazepam hs, lithium hs, olanzapine BID, Cogentin daily   · Also on Cogentin prn EPS  · Appreciate psych consult, likely overmedication and polypharmacy contributing to presentation  · Discussed with pharmacy and psych, will discontinue clozapine and fluvoxamine (fluvoxamine today increase clozapine levels by 50%). Decreased doxepin and Cogentin to reduce anticholinergic effects.    · Repeat lithium levels WNL  · Check clozapine level (send out test, expect results in 3-5 days)

## 2023-07-09 NOTE — PROGRESS NOTES
[facility]  Progress Note  Name: Lesa Bennett  MRN: [id number]  Unit/Bed#: -01 I Date of Admission: 7/6/2023   Date of Service: 7/9/2023 I Hospital Day: 3    Assessment/Plan   * Encephalopathy  Assessment & Plan  · Reportedly dysarthric and encephalopathic per EMS en route to ED  · Narcan given en route  · Upon arrival became agitated in CT scanner, required IV Ativan and haldol and sedated in restraints  · CTH negative   · UDS negative  · Neuro consulted for possible stroke, no concern for stroke presently nor for seizure. Non focal neuro exam.  · Can consider MRI if no return to baseline mentation  · B12 WNL, TSH WNL, ETOH lvl WNL  · No concern for infectious etiology: no leukocytosis, afebrile, procal wnl, CXR clear, no urinary complaints, neg lactate  · Pt has previously been seen in [address] [place] [address] ED for similar presentation with decreased responsiveness  · EEG was normal, Neuro felt no neurologic cause at that time  · [place] to be psychiatric in nature  · Likewise feel this presentation may be more so related to psychiatric illness and psychotropic medications  · Pt is currently taking 8 psychotropic medications at high doses  · Lithium level was suprisingly low, pt is on 3x max dose -so he reports compliance. States that they also have a "mouth check" policy and would be surprising if patient was not taking his medications. · Discussed with psychiatry as well as psych pharmacy. Concern for clozapine toxicity vs anticholinergic toxicity  · Improving, alert with mumbled speech (mumbles at baseline). AAOx 2 to person and date, not to place. Continue to hold antipsychotics/anticholinergics.   · Medication changes include:  · Clozapine 200mg - discontinued  · Fluvoxamine 300mg - discontinued  · Celexa 20mg - discontinued  · Cogentin 1mg - reduced to 0.5mg  · Doxepin 100mg - reduced to 50mg  · Zyprexa 20mg BID - continue at current dose  · Cardura 1mg - continue at current dose  · Patient has been stable from a psychiatric standpoint. Would continue on current regimen and reintroduce medications one a time as outpatient if needed, starting with SSRI. Schizoaffective disorder, bipolar type ([address])  Assessment & Plan  · Extensive review of med rec, personally entered every medication with records from group home  · Pt maintained on IM Abilify Maintena ER every month, citalopram daily, clozapine hs, fluvoxaminehs, doxepin hs, lorazepam hs, lithium hs, olanzapine BID, Cogentin daily   · Also on Cogentin prn EPS  · Appreciate psych consult, likely overmedication and polypharmacy contributing to presentation  · Discussed with pharmacy and psych, will discontinue clozapine and fluvoxamine (fluvoxamine today increase clozapine levels by 50%). Decreased doxepin and Cogentin to reduce anticholinergic effects. · Repeat lithium levels WNL  · Check clozapine level (send out test, expect results in 3-5 days)    GERD (gastroesophageal reflux disease)  Assessment & Plan  · Continue PPI    Hyperlipidemia  Assessment & Plan  · Continue statin    Hypertension  Assessment & Plan  · Normotensive here  · Continue lisinopril 40 mg, carvedilol 25 mg BID, chlorthalidone 25 mg daily, doxazosin 1 mg daily and amlodipine 10 mg daily    Seizure ([address])  Assessment & Plan  · Remote h/o seizure in 1971  · Seen by Neuro in 2019 at [address] [facility] for eval for recurrent seizure after episode of unresponsiveness and questionable post-ictal confusion at home   · EEG in 2019 normal, Neuro felt there was no concern for neurologic etiology  · Event was felt to be related to underlying psychiatric disorder vs psychiatric medication effect               VTE Pharmacologic Prophylaxis: VTE Score: 5 High Risk (Score >/= 5) - Pharmacological DVT Prophylaxis Ordered: enoxaparin (Lovenox). Sequential Compression Devices Ordered. Patient Centered Rounds: I performed bedside rounds with nursing staff today.   Discussions with Specialists or Other Care Team Provider: None    Education and Discussions with Family / Patient: Updated  (sister) via phone. Total Time Spent on Date of Encounter in care of patient: 45 minutes This time was spent on one or more of the following: performing physical exam; counseling and coordination of care; obtaining or reviewing history; documenting in the medical record; reviewing/ordering tests, medications or procedures; communicating with other healthcare professionals and discussing with patient's family/caregivers. Current Length of Stay: 3 day(s)  Current Patient Status: Inpatient   Certification Statement: The patient will continue to require additional inpatient hospital stay due to Monitoring mental status for improvement  Discharge Plan: Anticipate discharge in 24-48 hrs to group home. Code Status: Level 1 - Full Code    Subjective:   Patient remains AAO x2, does not know place. Knows he is in the hospital, however does not remember hospital name. Does not remember my name or RNs name either. Able to ambulate to chair with one-person assist, however does have shuffling gait. Objective:     Vitals:   Temp (24hrs), Av.5 °F (36.9 °C), Min:98.2 °F (36.8 °C), Max:98.7 °F (37.1 °C)    Temp:  [98.2 °F (36.8 °C)-98.7 °F (37.1 °C)] 98.7 °F (37.1 °C)  HR:  [65-79] 65  Resp:  [16-18] 18  BP: (119-136)/(63-74) 124/64  SpO2:  [92 %-94 %] 94 %  Body mass index is 29.26 kg/m². Input and Output Summary (last 24 hours): Intake/Output Summary (Last 24 hours) at 2023 1322  Last data filed at 2023 1231  Gross per 24 hour   Intake 420 ml   Output 3900 ml   Net -3480 ml       Physical Exam:   Physical Exam  Vitals and nursing note reviewed. Constitutional:       Appearance: Normal appearance. HENT:      Head: Normocephalic and atraumatic. Mouth/Throat:      Mouth: Mucous membranes are moist.      Pharynx: Oropharynx is clear. No oropharyngeal exudate.    Eyes:      Extraocular Movements: Extraocular movements intact. Cardiovascular:      Rate and Rhythm: Normal rate and regular rhythm. Pulses: Normal pulses. Heart sounds: Normal heart sounds. No murmur heard. No friction rub. No gallop. Pulmonary:      Effort: Pulmonary effort is normal. No respiratory distress. Breath sounds: Normal breath sounds. No stridor. No wheezing or rales. Abdominal:      General: Abdomen is flat. Bowel sounds are normal. There is no distension. Palpations: Abdomen is soft. Tenderness: There is no abdominal tenderness. Musculoskeletal:      Right lower leg: No edema. Left lower leg: No edema. Skin:     General: Skin is warm and dry. Neurological:      General: No focal deficit present. Mental Status: He is alert. Comments: AAO x2 to person and time          Additional Data:     Labs:  Results from last 7 days   Lab Units 07/09/23 0350 07/08/23 0602 07/07/23  0420   WBC Thousand/uL 8.82   < > 9.23   HEMOGLOBIN g/dL 13.9   < > 13.7   HEMATOCRIT % 45.8   < > 42.6   PLATELETS Thousands/uL 156   < > 175   NEUTROS PCT %  --   --  81*   LYMPHS PCT %  --   --  10*   MONOS PCT %  --   --  8   EOS PCT %  --   --  1    < > = values in this interval not displayed. Results from last 7 days   Lab Units 07/09/23  0350 07/08/23 0602 07/07/23  0420   SODIUM mmol/L 137   < > 140   POTASSIUM mmol/L 4.5   < > 3.7   CHLORIDE mmol/L 108   < > 107   CO2 mmol/L 25   < > 28   BUN mg/dL 17   < > 18   CREATININE mg/dL 0.81   < > 0.80   ANION GAP mmol/L 4   < > 5   CALCIUM mg/dL 9.0   < > 8.8   ALBUMIN g/dL  --   --  3.5   TOTAL BILIRUBIN mg/dL  --   --  0.49   ALK PHOS U/L  --   --  45   ALT U/L  --   --  25   AST U/L  --   --  18   GLUCOSE RANDOM mg/dL 119   < > 91    < > = values in this interval not displayed.      Results from last 7 days   Lab Units 07/06/23  1324   INR  0.94     Results from last 7 days   Lab Units 07/06/23  1410   POC GLUCOSE mg/dl 89         Results from last 7 days   Lab Units 07/06/23  1408   LACTIC ACID mmol/L 1.5   PROCALCITONIN ng/ml 0.10       Lines/Drains:  Invasive Devices     Peripheral Intravenous Line  Duration           Peripheral IV 07/06/23 Dorsal (posterior); Right Hand 3 days                      Imaging: No pertinent imaging reviewed. Recent Cultures (last 7 days):   Results from last 7 days   Lab Units 07/06/23  1408   BLOOD CULTURE  No Growth at 48 hrs. No Growth at 48 hrs. Last 24 Hours Medication List:   Current Facility-Administered Medications   Medication Dose Route Frequency Provider Last Rate   • amLODIPine  10 mg Oral Daily Lianna Judd MD     • atorvastatin  40 mg Oral Daily With Dinner Marta Esparza PA-C     • benztropine  0.5 mg Oral Daily Augustus Anant Amezcua PA-C     • benztropine  1 mg Oral Q8H PRN Marta Esparza PA-C     • carvedilol  25 mg Oral BID With Meals Marta Esparza PA-C     • chlorthalidone  25 mg Oral Daily Lianna Judd MD     • doxazosin  1 mg Oral HS Marta Esparza PA-C     • doxepin  50 mg Oral HS Shantel Hanson PA-C     • enoxaparin  40 mg Subcutaneous Q24H Lisy Lockett MD     • hydrALAZINE  5 mg Intravenous Q6H PRN Marta Esparza PA-C     • iohexol  85 mL Intravenous Once in imaging Marta Esparza PA-C     • lisinopril  40 mg Oral Daily Marta Esparza PA-C     • lithium carbonate  900 mg Oral HS Marta Esparza PA-C     • OLANZapine  20 mg Oral BID Marta Esparza PA-C     • pantoprazole  40 mg Oral Early Morning Marta Esparza PA-C     • sodium chloride  75 mL/hr Intravenous Continuous Marta Esparza PA-C 75 mL/hr (07/08/23 1917)        Today, Patient Was Seen By: Shantel Hanson PA-C    **Please Note: This note may have been constructed using a voice recognition system. **

## 2023-07-10 PROBLEM — G92.9 TOXIC ENCEPHALOPATHY: Status: ACTIVE | Noted: 2023-07-06

## 2023-07-10 LAB
ANION GAP SERPL CALCULATED.3IONS-SCNC: 3 MMOL/L
BASOPHILS # BLD AUTO: 0.03 THOUSANDS/ÂΜL (ref 0–0.1)
BASOPHILS NFR BLD AUTO: 0 % (ref 0–1)
BUN SERPL-MCNC: 17 MG/DL (ref 5–25)
CALCIUM SERPL-MCNC: 8.6 MG/DL (ref 8.4–10.2)
CHLORIDE SERPL-SCNC: 105 MMOL/L (ref 96–108)
CLOZAPINE SERPL-MCNC: 307 NG/ML (ref 350–600)
CLOZAPINE+NOR SERPL-MCNC: 345 NG/ML
CO2 SERPL-SCNC: 30 MMOL/L (ref 21–32)
CREAT SERPL-MCNC: 0.98 MG/DL (ref 0.6–1.3)
EOSINOPHIL # BLD AUTO: 0.12 THOUSAND/ÂΜL (ref 0–0.61)
EOSINOPHIL NFR BLD AUTO: 2 % (ref 0–6)
ERYTHROCYTE [DISTWIDTH] IN BLOOD BY AUTOMATED COUNT: 13.8 % (ref 11.6–15.1)
GFR SERPL CREATININE-BSD FRML MDRD: 77 ML/MIN/1.73SQ M
GLUCOSE SERPL-MCNC: 141 MG/DL (ref 65–140)
HCT VFR BLD AUTO: 40 % (ref 36.5–49.3)
HGB BLD-MCNC: 12.6 G/DL (ref 12–17)
IMM GRANULOCYTES # BLD AUTO: 0.07 THOUSAND/UL (ref 0–0.2)
IMM GRANULOCYTES NFR BLD AUTO: 1 % (ref 0–2)
LYMPHOCYTES # BLD AUTO: 0.91 THOUSANDS/ÂΜL (ref 0.6–4.47)
LYMPHOCYTES NFR BLD AUTO: 13 % (ref 14–44)
MCH RBC QN AUTO: 30.7 PG (ref 26.8–34.3)
MCHC RBC AUTO-ENTMCNC: 31.5 G/DL (ref 31.4–37.4)
MCV RBC AUTO: 97 FL (ref 82–98)
MONOCYTES # BLD AUTO: 0.55 THOUSAND/ÂΜL (ref 0.17–1.22)
MONOCYTES NFR BLD AUTO: 8 % (ref 4–12)
NEUTROPHILS # BLD AUTO: 5.51 THOUSANDS/ÂΜL (ref 1.85–7.62)
NEUTS SEG NFR BLD AUTO: 76 % (ref 43–75)
NORCLOZAPINE SERPL-MCNC: 38 NG/ML
NRBC BLD AUTO-RTO: 0 /100 WBCS
PLATELET # BLD AUTO: 198 THOUSANDS/UL (ref 149–390)
PMV BLD AUTO: 10.4 FL (ref 8.9–12.7)
POTASSIUM SERPL-SCNC: 3.6 MMOL/L (ref 3.5–5.3)
RBC # BLD AUTO: 4.11 MILLION/UL (ref 3.88–5.62)
SODIUM SERPL-SCNC: 138 MMOL/L (ref 135–147)
WBC # BLD AUTO: 7.19 THOUSAND/UL (ref 4.31–10.16)

## 2023-07-10 PROCEDURE — 85025 COMPLETE CBC W/AUTO DIFF WBC: CPT | Performed by: INTERNAL MEDICINE

## 2023-07-10 PROCEDURE — 80048 BASIC METABOLIC PNL TOTAL CA: CPT | Performed by: INTERNAL MEDICINE

## 2023-07-10 PROCEDURE — 99232 SBSQ HOSP IP/OBS MODERATE 35: CPT

## 2023-07-10 RX ADMIN — OLANZAPINE 20 MG: 10 TABLET, FILM COATED ORAL at 11:21

## 2023-07-10 RX ADMIN — AMLODIPINE BESYLATE 10 MG: 5 TABLET ORAL at 11:19

## 2023-07-10 RX ADMIN — CARVEDILOL 25 MG: 12.5 TABLET, FILM COATED ORAL at 17:19

## 2023-07-10 RX ADMIN — DOXEPIN HYDROCHLORIDE 50 MG: 50 CAPSULE ORAL at 22:17

## 2023-07-10 RX ADMIN — ENOXAPARIN SODIUM 40 MG: 100 INJECTION SUBCUTANEOUS at 11:21

## 2023-07-10 RX ADMIN — LISINOPRIL 40 MG: 20 TABLET ORAL at 11:20

## 2023-07-10 RX ADMIN — CARVEDILOL 25 MG: 12.5 TABLET, FILM COATED ORAL at 11:19

## 2023-07-10 RX ADMIN — BENZTROPINE MESYLATE 0.5 MG: 1 TABLET ORAL at 11:20

## 2023-07-10 RX ADMIN — PANTOPRAZOLE SODIUM 40 MG: 40 TABLET, DELAYED RELEASE ORAL at 06:34

## 2023-07-10 RX ADMIN — LITHIUM CARBONATE 900 MG: 300 TABLET, EXTENDED RELEASE ORAL at 22:17

## 2023-07-10 RX ADMIN — ATORVASTATIN CALCIUM 40 MG: 40 TABLET, FILM COATED ORAL at 17:19

## 2023-07-10 RX ADMIN — DOXAZOSIN 1 MG: 1 TABLET ORAL at 22:17

## 2023-07-10 RX ADMIN — CHLORTHALIDONE 25 MG: 25 TABLET ORAL at 11:21

## 2023-07-10 RX ADMIN — OLANZAPINE 20 MG: 10 TABLET, FILM COATED ORAL at 17:19

## 2023-07-10 NOTE — PROGRESS NOTES
[facility]  Progress Note  Name: Dionna Mullen  MRN: [id number]  Unit/Bed#: -01 I Date of Admission: 7/6/2023   Date of Service: 7/10/2023 I Hospital Day: 4    Assessment/Plan   * Toxic encephalopathy  Assessment & Plan  · Sent to ED from group home due to dysarthria, encephalopathy, lethargy  · Episode of agitation on day of admission in CT scanner, required restraints which have subsequently been removed  · UDS negative  · Neurology consulted:  · CT head and CTA head/neck without acute abnormality  · Previous recent EEG normal  · Low clinical concern for seizure or stroke, no further inpatient neurologic work-up unless change in mental status or no return to baseline  · B12, TSH, ETOH WNL  · No evidence of infection, VSS  · Psychiatry consulted due to concern for polypharmacy given prior regimen of 8 high-dose psychiatric medications  · Lithium level rechecked, normal  · Medications adjusted as follows:  · Clozapine 200mg - discontinued  · Fluvoxamine 300mg - discontinued  · Celexa 20mg - discontinued  · Cogentin 1mg - reduced to 0.5mg  · Doxepin 100mg - reduced to 50mg  · Zyprexa 20mg BID - continue at current dose  · Cardura 1mg - continue at current dose  · Patient appears to have returned to baseline, encephalopathy resolved. Patient stable from psychiatric standpoint, no behavioral outbursts or agitation.   Cleared for discharge, planning for tomorrow    GERD (gastroesophageal reflux disease)  Assessment & Plan  · Continue PPI    Hyperlipidemia  Assessment & Plan  · Continue statin    Hypertension  Assessment & Plan  · Normotensive here  · Continue lisinopril 40 mg, carvedilol 25 mg BID, chlorthalidone 25 mg daily, doxazosin 1 mg daily and amlodipine 10 mg daily    Seizure (HCC)  Assessment & Plan  · Remote h/o seizure in 1971  · Seen by Neuro in 2019 at [address] [facility] for eval for recurrent seizure after episode of unresponsiveness and questionable post-ictal confusion at home · EEG in 2019 normal, Neuro felt there was no concern for neurologic etiology  · Event was felt to be related to underlying psychiatric disorder vs psychiatric medication effect    Schizoaffective disorder, bipolar type ([address])  Assessment & Plan  · Extensive review of med rec performed on admission  · Pt previously maintained on IM Abilify Maintena ER every month, citalopram daily, clozapine hs, fluvoxaminehs, doxepin hs, lorazepam hs, lithium hs, olanzapine BID, Cogentin daily   · Also on Cogentin prn EPS  · Appreciate psych consult, likely overmedication and polypharmacy contributing to presentation  · Discussed with pharmacy and psych, discontinued clozapine and fluvoxamine. Decreased doxepin and Cogentin to reduce anticholinergic effects. · Repeat lithium levels WNL               VTE Pharmacologic Prophylaxis: VTE Score: 5 High Risk (Score >/= 5) - Pharmacological DVT Prophylaxis Ordered: enoxaparin (Lovenox). Sequential Compression Devices Ordered. Patient Centered Rounds: I performed bedside rounds with nursing staff today. Discussions with Specialists or Other Care Team Provider: Psych and neuro    Education and Discussions with Family / Patient: Updated  (sister and care giver) via phone. Total Time Spent on Date of Encounter in care of patient: 35 minutes This time was spent on one or more of the following: performing physical exam; counseling and coordination of care; obtaining or reviewing history; documenting in the medical record; reviewing/ordering tests, medications or procedures; communicating with other healthcare professionals and discussing with patient's family/caregivers. Current Length of Stay: 4 day(s)  Current Patient Status: Inpatient   Certification Statement: The patient will continue to require additional inpatient hospital stay due to dispo planning  Discharge Plan: Anticipate discharge tomorrow to prior assisted or independent living facility.     Code Status: Level 1 - Full Code    Subjective:   Patient appears to be at mental status baseline per my discussion with sister, caregivers at facility. Alert today and oriented to self, month, year, day of week, date, situation, family, etc. He denies any pain or complaints. Looking forward to discharge. Objective:     Vitals:   Temp (24hrs), Av.4 °F (36.9 °C), Min:98 °F (36.7 °C), Max:98.6 °F (37 °C)    Temp:  [98 °F (36.7 °C)-98.6 °F (37 °C)] 98 °F (36.7 °C)  HR:  [67-69] 67  Resp:  [16-18] 16  BP: (105-125)/(58-67) 124/67  SpO2:  [87 %-95 %] 95 %  Body mass index is 29.26 kg/m². Input and Output Summary (last 24 hours): Intake/Output Summary (Last 24 hours) at 7/10/2023 1607  Last data filed at 7/10/2023 1441  Gross per 24 hour   Intake 720 ml   Output 2375 ml   Net -1655 ml       Physical Exam:   Physical Exam  Vitals and nursing note reviewed. Constitutional:       General: He is not in acute distress. Appearance: He is well-developed. He is not ill-appearing. HENT:      Head: Normocephalic and atraumatic. Eyes:      General:         Right eye: No discharge. Left eye: No discharge. Conjunctiva/sclera: Conjunctivae normal.   Cardiovascular:      Rate and Rhythm: Normal rate and regular rhythm. Heart sounds: No murmur heard. Pulmonary:      Effort: Pulmonary effort is normal. No respiratory distress. Breath sounds: Normal breath sounds. No wheezing, rhonchi or rales. Abdominal:      General: Bowel sounds are normal. There is no distension. Palpations: Abdomen is soft. Tenderness: There is no abdominal tenderness. Musculoskeletal:      Cervical back: Neck supple. Right lower leg: No edema. Left lower leg: No edema. Skin:     General: Skin is warm and dry. Capillary Refill: Capillary refill takes less than 2 seconds. Neurological:      General: No focal deficit present. Mental Status: He is alert and oriented to person, place, and time. Mental status is at baseline. Cranial Nerves: No cranial nerve deficit. Psychiatric:         Mood and Affect: Mood normal.         Behavior: Behavior normal.          Additional Data:     Labs:  Results from last 7 days   Lab Units 07/10/23  0313   WBC Thousand/uL 7.19   HEMOGLOBIN g/dL 12.6   HEMATOCRIT % 40.0   PLATELETS Thousands/uL 198   NEUTROS PCT % 76*   LYMPHS PCT % 13*   MONOS PCT % 8   EOS PCT % 2     Results from last 7 days   Lab Units 07/10/23  0313 07/08/23  0602 07/07/23  0420   SODIUM mmol/L 138   < > 140   POTASSIUM mmol/L 3.6   < > 3.7   CHLORIDE mmol/L 105   < > 107   CO2 mmol/L 30   < > 28   BUN mg/dL 17   < > 18   CREATININE mg/dL 0.98   < > 0.80   ANION GAP mmol/L 3   < > 5   CALCIUM mg/dL 8.6   < > 8.8   ALBUMIN g/dL  --   --  3.5   TOTAL BILIRUBIN mg/dL  --   --  0.49   ALK PHOS U/L  --   --  45   ALT U/L  --   --  25   AST U/L  --   --  18   GLUCOSE RANDOM mg/dL 141*   < > 91    < > = values in this interval not displayed. Results from last 7 days   Lab Units 07/06/23  1324   INR  0.94     Results from last 7 days   Lab Units 07/06/23  1410   POC GLUCOSE mg/dl 89         Results from last 7 days   Lab Units 07/06/23  1408   LACTIC ACID mmol/L 1.5   PROCALCITONIN ng/ml 0.10       Lines/Drains:  Invasive Devices     Peripheral Intravenous Line  Duration           Peripheral IV 07/10/23 Dorsal (posterior); Right Wrist <1 day                      Imaging: Reviewed radiology reports from this admission including: CT head    Recent Cultures (last 7 days):   Results from last 7 days   Lab Units 07/06/23  1408   BLOOD CULTURE  No Growth at 72 hrs. No Growth at 72 hrs.        Last 24 Hours Medication List:   Current Facility-Administered Medications   Medication Dose Route Frequency Provider Last Rate   • amLODIPine  10 mg Oral Daily Sal Bang MD     • atorvastatin  40 mg Oral Daily With Dinner Abimbola Zuñiga PA-C     • benztropine  0.5 mg Oral Daily Bushra Wright PA-C • benztropine  1 mg Oral Q8H PRN Percilla Michelle, JESSIKA     • carvedilol  25 mg Oral BID With Meals Percepi Martinez, JESSIKA     • chlorthalidone  25 mg Oral Daily Xiomara Uriostegui MD     • doxazosin  1 mg Oral HS Percilla Michelle, JESSIKA     • doxepin  50 mg Oral HS Scheryl Pool, JESSIKA     • enoxaparin  40 mg Subcutaneous Q24H [address] Junito Owens MD     • hydrALAZINE  5 mg Intravenous Q6H PRN Percilla Michelle, JESSIKA     • iohexol  85 mL Intravenous Once in imaging Percilla Michelle, JESSIKA     • lisinopril  40 mg Oral Daily Percilla Michelle, JESSIKA     • lithium carbonate  900 mg Oral HS Percilla Michelle, JESSIKA     • OLANZapine  20 mg Oral BID Percilla Michelle, JESSIKA     • pantoprazole  40 mg Oral Early Morning Percilla Michelle, JESSIKA          Today, Patient Was Seen By: Jameel Molina PA-C    **Please Note: This note may have been constructed using a voice recognition system. **

## 2023-07-10 NOTE — CASE MANAGEMENT
Case Management Discharge Planning Note    Patient name Evelin Conception  Location /-32 MRN [id number]  : 1955 Date 7/10/2023       Current Admission Date: 2023  Current Admission Diagnosis:Encephalopathy   Patient Active Problem List    Diagnosis Date Noted   • Schizoaffective disorder, bipolar type ([address]) 2023   • Obsessive compulsive disorder 2023   • Seizure ([address]) 2023   • Hypertension 2023   • Encephalopathy 2023   • Hyperlipidemia 2023   • GERD (gastroesophageal reflux disease) 2023      LOS (days): 4  Geometric Mean LOS (GMLOS) (days): 2.30  Days to GMLOS:-1.7     OBJECTIVE:  Risk of Unplanned Readmission Score: 13.54         Current admission status: Inpatient   Preferred Pharmacy:   PATIENT/FAMILY REPORTS NO PREFERRED PHARMACY  No address on file      Primary Care Provider: No primary care provider on file. Primary Insurance: MEDICARE  Secondary Insurance: [facility]    DISCHARGE DETAILS:  Additional Comments: Met with Alexandro Barbosa from [facility]([phone]), Alexandro Barbosa evaluated Pt and facility can accept Pt back tomorrow and will transport at 2pm. Faxed requested medication list to [employer] [facility] [place] [employer] at [phone]. CM to follow.

## 2023-07-10 NOTE — ASSESSMENT & PLAN NOTE
· Sent to ED from group home due to dysarthria, encephalopathy, lethargy  · Episode of agitation on day of admission in CT scanner, required restraints which have subsequently been removed  · UDS negative  · Neurology consulted:  · CT head and CTA head/neck without acute abnormality  · Previous recent EEG normal  · Low clinical concern for seizure or stroke, no further inpatient neurologic work-up unless change in mental status or no return to baseline  · B12, TSH, ETOH WNL  · No evidence of infection, VSS  · Psychiatry consulted due to concern for polypharmacy given prior regimen of 8 high-dose psychiatric medications  · Lithium level rechecked, normal  · Medications adjusted as follows:  · Clozapine 200mg - discontinued  · Fluvoxamine 300mg - discontinued  · Celexa 20mg - discontinued  · Cogentin 1mg - reduced to 0.5mg  · Doxepin 100mg - reduced to 50mg  · Zyprexa 20mg BID - continue at current dose  · Cardura 1mg - continue at current dose  · Patient appears to have returned to baseline, encephalopathy resolved. Patient stable from psychiatric standpoint, no behavioral outbursts or agitation.   Cleared for discharge, planning for tomorrow

## 2023-07-10 NOTE — ASSESSMENT & PLAN NOTE
· Remote h/o seizure in 1971  · [facility] after episode of unresponsiveness and questionable post-ictal confusion at home   · EEG in 2019 normal, Neuro felt there was no concern for neurologic etiology  · Event was felt to be related to underlying psychiatric disorder vs psychiatric medication effect

## 2023-07-10 NOTE — ASSESSMENT & PLAN NOTE
· Extensive review of med rec performed on admission  · Pt previously maintained on IM Abilify Maintena ER every month, citalopram daily, clozapine hs, fluvoxaminehs, doxepin hs, lorazepam hs, lithium hs, olanzapine BID, Cogentin daily   · Also on Cogentin prn EPS  · Appreciate psych consult, likely overmedication and polypharmacy contributing to presentation  · Discussed with pharmacy and psych, discontinued clozapine and fluvoxamine. Decreased doxepin and Cogentin to reduce anticholinergic effects.    · Repeat lithium levels WNL

## 2023-07-10 NOTE — PLAN OF CARE
Problem: PAIN - ADULT  Goal: Verbalizes/displays adequate comfort level or baseline comfort level  Description: Interventions:  - Encourage patient to monitor pain and request assistance  - Assess pain using appropriate pain scale  - Administer analgesics based on type and severity of pain and evaluate response  - Implement non-pharmacological measures as appropriate and evaluate response  - Consider cultural and social influences on pain and pain management  - Notify physician/advanced practitioner if interventions unsuccessful or patient reports new pain  Outcome: Progressing     Problem: INFECTION - ADULT  Goal: Absence or prevention of progression during hospitalization  Description: INTERVENTIONS:  - Assess and monitor for signs and symptoms of infection  - Monitor lab/diagnostic results  - Monitor all insertion sites, i.e. indwelling lines, tubes, and drains  - Monitor endotracheal if appropriate and nasal secretions for changes in amount and color  - [place] appropriate cooling/warming therapies per order  - Administer medications as ordered  - Instruct and encourage patient and family to use good hand hygiene technique  - Identify and instruct in appropriate isolation precautions for identified infection/condition  Outcome: Progressing  Goal: Absence of fever/infection during neutropenic period  Description: INTERVENTIONS:  - Monitor WBC    Outcome: Progressing     Problem: SAFETY ADULT  Goal: Patient will remain free of falls  Description: INTERVENTIONS:  - Educate patient/family on patient safety including physical limitations  - Instruct patient to call for assistance with activity   - Consult OT/PT to assist with strengthening/mobility   - Keep Call bell within reach  - Keep bed low and locked with side rails adjusted as appropriate  - Keep care items and personal belongings within reach  - Initiate and maintain comfort rounds  - Make Fall Risk Sign visible to staff  - Offer Toileting every 2 Hours, in advance of need  - Initiate/Maintain bed alarm  - Apply yellow socks and bracelet for high fall risk patients  - Consider moving patient to room near nurses station  Outcome: Progressing  Goal: Maintain or return to baseline ADL function  Description: INTERVENTIONS:  -  Assess patient's ability to carry out ADLs; assess patient's baseline for ADL function and identify physical deficits which impact ability to perform ADLs (bathing, care of mouth/teeth, toileting, grooming, dressing, etc.)  - Assess/evaluate cause of self-care deficits   - Assess range of motion  - Assess patient's mobility; develop plan if impaired  - Assess patient's need for assistive devices and provide as appropriate  - Encourage maximum independence but intervene and supervise when necessary  - Involve family in performance of ADLs  - Assess for home care needs following discharge   - Consider OT consult to assist with ADL evaluation and planning for discharge  - Provide patient education as appropriate  Outcome: Progressing  Goal: Maintains/Returns to pre admission functional level  Description: INTERVENTIONS:  - Perform BMAT or MOVE assessment daily.   - Set and communicate daily mobility goal to care team and patient/family/caregiver. - Collaborate with rehabilitation services on mobility goals if consulted  - Perform Range of Motion 3 times a day. - Reposition patient every 2 hours.   - Dangle patient 3 times a day  - Stand patient 3 times a day  - Ambulate patient 3 times a day  - Out of bed to chair 3 times a day   - Out of bed for meals 3 times a day  - Out of bed for toileting  - Record patient progress and toleration of activity level   Outcome: Progressing     Problem: DISCHARGE PLANNING  Goal: Discharge to home or other facility with appropriate resources  Description: INTERVENTIONS:  - Identify barriers to discharge w/patient and caregiver  - Arrange for needed discharge resources and transportation as appropriate  - Identify discharge learning needs (meds, wound care, etc.)  - Arrange for interpretive services to assist at discharge as needed  - Refer to Case Management Department for coordinating discharge planning if the patient needs post-hospital services based on physician/advanced practitioner order or complex needs related to functional status, cognitive ability, or social support system  Outcome: Progressing     Problem: Knowledge Deficit  Goal: Patient/family/caregiver demonstrates understanding of disease process, treatment plan, medications, and discharge instructions  Description: Complete learning assessment and assess knowledge base. Interventions:  - Provide teaching at level of understanding  - Provide teaching via preferred learning methods  Outcome: Progressing     Problem: Nutrition/Hydration-ADULT  Goal: Nutrient/Hydration intake appropriate for improving, restoring or maintaining nutritional needs  Description: Monitor and assess patient's nutrition/hydration status for malnutrition. Collaborate with interdisciplinary team and initiate plan and interventions as ordered. Monitor patient's weight and dietary intake as ordered or per policy. Utilize nutrition screening tool and intervene as necessary. Determine patient's food preferences and provide high-protein, high-caloric foods as appropriate.      INTERVENTIONS:  - Monitor oral intake, urinary output, labs, and treatment plans  - Assess nutrition and hydration status and recommend course of action  - Evaluate amount of meals eaten  - Assist patient with eating if necessary   - Allow adequate time for meals  - Recommend/ encourage appropriate diets, oral nutritional supplements, and vitamin/mineral supplements  - Order, calculate, and assess calorie counts as needed  - Recommend, monitor, and adjust tube feedings and TPN/PPN based on assessed needs  - Assess need for intravenous fluids  - Provide specific nutrition/hydration education as appropriate  - Include patient/family/caregiver in decisions related to nutrition  Outcome: Progressing     Problem: MOBILITY - ADULT  Goal: Maintain or return to baseline ADL function  Description: INTERVENTIONS:  -  Assess patient's ability to carry out ADLs; assess patient's baseline for ADL function and identify physical deficits which impact ability to perform ADLs (bathing, care of mouth/teeth, toileting, grooming, dressing, etc.)  - Assess/evaluate cause of self-care deficits   - Assess range of motion  - Assess patient's mobility; develop plan if impaired  - Assess patient's need for assistive devices and provide as appropriate  - Encourage maximum independence but intervene and supervise when necessary  - Involve family in performance of ADLs  - Assess for home care needs following discharge   - Consider OT consult to assist with ADL evaluation and planning for discharge  - Provide patient education as appropriate  Outcome: Progressing  Goal: Maintains/Returns to pre admission functional level  Description: INTERVENTIONS:  - Perform BMAT or MOVE assessment daily.   - Set and communicate daily mobility goal to care team and patient/family/caregiver. - Collaborate with rehabilitation services on mobility goals if consulted  - Perform Range of Motion 3 times a day. - Reposition patient every 2 hours.   - Dangle patient 3 times a day  - Stand patient 3 times a day  - Ambulate patient 3 times a day  - Out of bed to chair 3 times a day   - Out of bed for meals 3 times a day  - Out of bed for toileting  - Record patient progress and toleration of activity level   Outcome: Progressing     Problem: SAFETY,RESTRAINT: NV/NON-SELF DESTRUCTIVE BEHAVIOR  Goal: Remains free of harm/injury (restraint for non violent/non self-detsructive behavior)  Description: INTERVENTIONS:  - Instruct patient/family regarding restraint use   - Assess and monitor physiologic and psychological status   - Provide interventions and comfort measures to meet assessed patient needs   - Identify and implement measures to help patient regain control  - Assess readiness for release of restraint   Outcome: Progressing     Problem: Prexisting or High Potential for Compromised Skin Integrity  Goal: Skin integrity is maintained or improved  Description: INTERVENTIONS:  - Identify patients at risk for skin breakdown  - Assess and monitor skin integrity  - Assess and monitor nutrition and hydration status  - Monitor labs   - Assess for incontinence   - Turn and reposition patient  - Assist with mobility/ambulation  - Relieve pressure over bony prominences  - Avoid friction and shearing  - Provide appropriate hygiene as needed including keeping skin clean and dry  - Evaluate need for skin moisturizer/barrier cream  - Collaborate with interdisciplinary team   - Patient/family teaching  - Consider wound care consult   Outcome: Progressing     Problem: NEUROSENSORY - ADULT  Goal: Achieves stable or improved neurological status  Description: INTERVENTIONS  - Monitor and report changes in neurological status  - Monitor vital signs such as temperature, blood pressure, glucose, and any other labs ordered   - Initiate measures to prevent increased intracranial pressure  - Monitor for seizure activity and implement precautions if appropriate      Outcome: Progressing  Goal: Achieves maximal functionality and self care  Description: INTERVENTIONS  - Monitor swallowing and airway patency with patient fatigue and changes in neurological status  - Encourage and assist patient to increase activity and self care.    - Encourage visually impaired, hearing impaired and aphasic patients to use assistive/communication devices  Outcome: Progressing

## 2023-07-10 NOTE — DISCHARGE SUMMARY
[facility]  Discharge- Tory Henriquezter 1955, 77 y.o. male MRN: [id number]  Unit/Bed#: -[facility] Encounter: [id number]  Primary Care Provider: No primary care provider on file. Date and time admitted to hospital: 7/6/2023  1:18 PM    * Toxic encephalopathy  Assessment & Plan  · Sent to ED from group home due to dysarthria, encephalopathy, lethargy  · Episode of agitation on day of admission in CT scanner, required restraints which have subsequently been removed  · UDS negative  · Neurology consulted:  · CT head and CTA head/neck without acute abnormality  · Previous recent EEG normal  · Low clinical concern for seizure or stroke, no further inpatient neurologic work-up unless change in mental status or no return to baseline  · B12, TSH, ETOH WNL  · No evidence of infection, VSS  · Psychiatry consulted due to concern for polypharmacy given prior regimen of 8 high-dose psychiatric medications  · Lithium level rechecked, normal  · Medications adjusted as follows:  · Clozapine 200mg - discontinued  · Fluvoxamine 300mg - discontinued  · Celexa 20mg - discontinued  · Cogentin 1mg - reduced to 0.5mg  · Doxepin 100mg - reduced to 50mg  · Zyprexa 20mg BID - continue at current dose  · Cardura 1mg - continue at current dose  · Patient appears to have returned to baseline, encephalopathy resolved. Patient stable from psychiatric standpoint, no behavioral outbursts or agitation. Cleared for discharge today 7/11 to return to prior facility. Continue current regimen and follow up with patient's outpatient psychiatrist for further medication adjustment.      GERD (gastroesophageal reflux disease)  Assessment & Plan  · Continue PPI    Hyperlipidemia  Assessment & Plan  · Continue statin    Hypertension  Assessment & Plan  · Normotensive here  · Continue lisinopril 40 mg, carvedilol 25 mg BID, chlorthalidone 25 mg daily, doxazosin 1 mg daily and amlodipine 10 mg daily    Seizure (HCC)  Assessment & Plan  · Remote h/o seizure in 1971  · Seen by Neuro in 2019 at [address] [facility] for eval for recurrent seizure after episode of unresponsiveness and questionable post-ictal confusion at home   · EEG in 2019 normal, Neuro felt there was no concern for neurologic etiology  · Event was felt to be related to underlying psychiatric disorder vs psychiatric medication effect    Schizoaffective disorder, bipolar type ([address])  Assessment & Plan  · Extensive review of med rec performed on admission  · Pt previously maintained on IM Abilify Maintena ER every month, citalopram daily, clozapine hs, fluvoxaminehs, doxepin hs, lorazepam hs, lithium hs, olanzapine BID, Cogentin daily   · Also on Cogentin prn EPS  · Appreciate psych consult, likely overmedication and polypharmacy contributing to presentation  · Discussed with pharmacy and psych, discontinued clozapine and fluvoxamine. Decreased doxepin and Cogentin to reduce anticholinergic effects. · Repeat lithium levels WNL  · See above plan      Medical Problems     Resolved Problems  Date Reviewed: 7/11/2023   None       Discharging Physician / Practitioner: Nora Vargas PA-C  PCP: No primary care provider on file. Admission Date:   Admission Orders (From admission, onward)     Ordered        07/06/23 1625  INPATIENT ADMISSION  Once                      Discharge Date: 07/11/23    Consultations During Hospital Stay:  · Psychiatry   · Neurology     Procedures Performed:   X-ray chest 1 view portable   Final Result by Jami Barriga MD (07/06 0058)      No acute cardiopulmonary disease. Workstation performed: [id number]         CT stroke alert brain   Final Result by Brooklynn Melton MD (07/06 5011)   No acute intracranial hemorrhage, mass effect or edema. Workstation performed: [id number]         CTA stroke alert (head/neck)   Final Result by Brooklynn Melton MD (07/06 9573)         1.   No hemodynamically significant stenosis in the major arteries of the neck. 2.  No intracranial aneurysm or major intracranial arterial stenosis. 3.  No acute intracranial hemorrhage. I personally communicated the preliminary results of this study with Gigi Carrero and Vianca Stanton on 7/6/2023 2:38 PM.                        Workstation performed: [id number]           ·     Significant Findings / Test Results:   · Blood culture x 2 negative at 4 days  · UDS negative   · Medical ETOH negative   · TSH 0.4, T4 0.82  · Lithium level 0. 3 > 0.7  · Clozapine level 307    Incidental Findings:   · None     Test Results Pending at Discharge (will require follow up): · None      Outpatient Tests Requested:  · None     Complications:  Patient required restraints briefly overnight 7/6-7/7    Reason for Admission: Change in mental status, slurred speech     Hospital Course:   Marcia Ruano is a 77 y.o. male patient with PMH of HTN, OCD, seizures, schizoaffective disorder who originally presented to the hospital from group home via EMS on 7/6/2023 due to slurred speech and encephalopathy. Patient received Narcan in the ED without significant improvement in mental status, he then became acutely agitated in CT scanner requiring restraints and sedation. Neurology was consulted given concern for possible stroke versus seizures, however given CT head and CTA head/neck without any acute abnormality and nonfocal clinical exam, the patient symptoms were not thought to be secondary to neurologic etiology. Psychiatry was consulted as the patient was on significant psychiatric regimen with multiple medications and high doses. Psych suspected that symptoms were secondary to polypharmacy. The patient's regimen was adjusted per psychiatric recommendations, as above, which resulted in marked clinical improvement and return to patient's baseline mental status > 24 hours prior to DC.   Throughout admission, patient was without any infectious signs/symptoms or other metabolic derangement to explain symptoms. Patient will be discharged on current regimen as above, with outpatient psychiatric follow-up for further medication adjustment. Discussed with facility who visited patient 7/10, will plan for DC today around 1400. Please see above list of diagnoses and related plan for additional information. Condition at Discharge: good    Discharge Day Visit / Exam:   Subjective:  Patient A&O x 3, at mental status baseline. Patient denies any physical complaints currently. Looking forward to discharge. Vitals: Blood Pressure: 119/67 (07/11/23 0727)  Pulse: 62 (07/11/23 0727)  Temperature: 98.2 °F (36.8 °C) (07/11/23 0727)  Temp Source: Oral (07/11/23 0727)  Respirations: 18 (07/11/23 0727)  Height: 5' 8" (172.7 cm) (07/06/23 1837)  Weight - Scale: 87.3 kg (192 lb 7.4 oz) (07/06/23 1404)  SpO2: 93 % (07/11/23 0727)  Exam:   Physical Exam  Vitals and nursing note reviewed. Constitutional:       General: He is not in acute distress. Appearance: He is well-developed. He is not ill-appearing. HENT:      Head: Normocephalic and atraumatic. Eyes:      General:         Right eye: No discharge. Left eye: No discharge. Extraocular Movements: Extraocular movements intact. Conjunctiva/sclera: Conjunctivae normal.   Cardiovascular:      Rate and Rhythm: Normal rate and regular rhythm. Heart sounds: No murmur heard. Pulmonary:      Effort: Pulmonary effort is normal. No respiratory distress. Breath sounds: Normal breath sounds. No wheezing, rhonchi or rales. Abdominal:      General: Bowel sounds are normal. There is no distension. Palpations: Abdomen is soft. Tenderness: There is no abdominal tenderness. Musculoskeletal:      Cervical back: Neck supple. Right lower leg: No edema. Left lower leg: No edema. Skin:     General: Skin is warm and dry. Capillary Refill: Capillary refill takes less than 2 seconds. Neurological:      Mental Status: He is alert and oriented to person, place, and time. Mental status is at baseline. Cranial Nerves: No cranial nerve deficit. Sensory: No sensory deficit. Motor: No weakness. Coordination: Coordination normal.   Psychiatric:         Mood and Affect: Mood normal.         Behavior: Behavior normal.          Discussion with Family: Updated  (sister) via phone. Discharge instructions/Information to patient and family:   See after visit summary for information provided to patient and family. Provisions for Follow-Up Care:  See after visit summary for information related to follow-up care and any pertinent home health orders. Disposition:   [facility] / [address] [place] [address] Readmission: None      Discharge Statement:  I spent 65 minutes discharging the patient. This time was spent on the day of discharge. I had direct contact with the patient on the day of discharge. Greater than 50% of the total time was spent examining patient, answering all patient questions, arranging and discussing plan of care with patient as well as directly providing post-discharge instructions. Additional time then spent on discharge activities. Discharge Medications:  See after visit summary for reconciled discharge medications provided to patient and/or family.       **Please Note: This note may have been constructed using a voice recognition system**

## 2023-07-10 NOTE — PLAN OF CARE
Problem: PAIN - ADULT  Goal: Verbalizes/displays adequate comfort level or baseline comfort level  Description: Interventions:  - Encourage patient to monitor pain and request assistance  - Assess pain using appropriate pain scale  - Administer analgesics based on type and severity of pain and evaluate response  - Implement non-pharmacological measures as appropriate and evaluate response  - Consider cultural and social influences on pain and pain management  - Notify physician/advanced practitioner if interventions unsuccessful or patient reports new pain  Outcome: Progressing     Problem: INFECTION - ADULT  Goal: Absence or prevention of progression during hospitalization  Description: INTERVENTIONS:  - Assess and monitor for signs and symptoms of infection  - Monitor lab/diagnostic results  - Monitor all insertion sites, i.e. indwelling lines, tubes, and drains  - Monitor endotracheal if appropriate and nasal secretions for changes in amount and color  - [place] appropriate cooling/warming therapies per order  - Administer medications as ordered  - Instruct and encourage patient and family to use good hand hygiene technique  - Identify and instruct in appropriate isolation precautions for identified infection/condition  Outcome: Progressing     Problem: Knowledge Deficit  Goal: Patient/family/caregiver demonstrates understanding of disease process, treatment plan, medications, and discharge instructions  Description: Complete learning assessment and assess knowledge base.   Interventions:  - Provide teaching at level of understanding  - Provide teaching via preferred learning methods  Outcome: Progressing     Problem: DISCHARGE PLANNING  Goal: Discharge to home or other facility with appropriate resources  Description: INTERVENTIONS:  - Identify barriers to discharge w/patient and caregiver  - Arrange for needed discharge resources and transportation as appropriate  - Identify discharge learning needs (meds, wound care, etc.)  - Arrange for interpretive services to assist at discharge as needed  - Refer to Case Management Department for coordinating discharge planning if the patient needs post-hospital services based on physician/advanced practitioner order or complex needs related to functional status, cognitive ability, or social support system  Outcome: Progressing

## 2023-07-11 VITALS
HEIGHT: 68 IN | BODY MASS INDEX: 29.17 KG/M2 | DIASTOLIC BLOOD PRESSURE: 67 MMHG | HEART RATE: 62 BPM | WEIGHT: 192.46 LBS | RESPIRATION RATE: 18 BRPM | TEMPERATURE: 98.2 F | OXYGEN SATURATION: 93 % | SYSTOLIC BLOOD PRESSURE: 119 MMHG

## 2023-07-11 LAB
ANION GAP SERPL CALCULATED.3IONS-SCNC: 4 MMOL/L
BACTERIA BLD CULT: NORMAL
BACTERIA BLD CULT: NORMAL
BUN SERPL-MCNC: 13 MG/DL (ref 5–25)
CALCIUM SERPL-MCNC: 9.6 MG/DL (ref 8.4–10.2)
CHLORIDE SERPL-SCNC: 102 MMOL/L (ref 96–108)
CO2 SERPL-SCNC: 32 MMOL/L (ref 21–32)
CREAT SERPL-MCNC: 0.88 MG/DL (ref 0.6–1.3)
ERYTHROCYTE [DISTWIDTH] IN BLOOD BY AUTOMATED COUNT: 13.6 % (ref 11.6–15.1)
GFR SERPL CREATININE-BSD FRML MDRD: 87 ML/MIN/1.73SQ M
GLUCOSE SERPL-MCNC: 105 MG/DL (ref 65–140)
HCT VFR BLD AUTO: 44 % (ref 36.5–49.3)
HGB BLD-MCNC: 13.8 G/DL (ref 12–17)
MCH RBC QN AUTO: 30.5 PG (ref 26.8–34.3)
MCHC RBC AUTO-ENTMCNC: 31.4 G/DL (ref 31.4–37.4)
MCV RBC AUTO: 97 FL (ref 82–98)
PLATELET # BLD AUTO: 208 THOUSANDS/UL (ref 149–390)
PMV BLD AUTO: 9.8 FL (ref 8.9–12.7)
POTASSIUM SERPL-SCNC: 4 MMOL/L (ref 3.5–5.3)
RBC # BLD AUTO: 4.52 MILLION/UL (ref 3.88–5.62)
SODIUM SERPL-SCNC: 138 MMOL/L (ref 135–147)
WBC # BLD AUTO: 6.56 THOUSAND/UL (ref 4.31–10.16)

## 2023-07-11 PROCEDURE — 99239 HOSP IP/OBS DSCHRG MGMT >30: CPT

## 2023-07-11 PROCEDURE — 80048 BASIC METABOLIC PNL TOTAL CA: CPT | Performed by: INTERNAL MEDICINE

## 2023-07-11 PROCEDURE — 85027 COMPLETE CBC AUTOMATED: CPT | Performed by: INTERNAL MEDICINE

## 2023-07-11 RX ORDER — DOXEPIN HYDROCHLORIDE 50 MG/1
50 CAPSULE ORAL
Qty: 30 CAPSULE | Refills: 0
Start: 2023-07-11 | End: 2023-08-10

## 2023-07-11 RX ORDER — BENZTROPINE MESYLATE 0.5 MG/1
0.5 TABLET ORAL DAILY
Qty: 30 TABLET | Refills: 0
Start: 2023-07-12 | End: 2023-08-11

## 2023-07-11 RX ADMIN — AMLODIPINE BESYLATE 10 MG: 5 TABLET ORAL at 08:11

## 2023-07-11 RX ADMIN — CHLORTHALIDONE 25 MG: 25 TABLET ORAL at 08:11

## 2023-07-11 RX ADMIN — PANTOPRAZOLE SODIUM 40 MG: 40 TABLET, DELAYED RELEASE ORAL at 04:59

## 2023-07-11 RX ADMIN — LISINOPRIL 40 MG: 20 TABLET ORAL at 08:11

## 2023-07-11 RX ADMIN — CARVEDILOL 25 MG: 12.5 TABLET, FILM COATED ORAL at 08:11

## 2023-07-11 RX ADMIN — BENZTROPINE MESYLATE 0.5 MG: 1 TABLET ORAL at 08:11

## 2023-07-11 RX ADMIN — ENOXAPARIN SODIUM 40 MG: 100 INJECTION SUBCUTANEOUS at 08:13

## 2023-07-11 RX ADMIN — OLANZAPINE 20 MG: 10 TABLET, FILM COATED ORAL at 08:11

## 2023-07-11 NOTE — ASSESSMENT & PLAN NOTE
"  Subjective:      13 y.o. male presents to urgent care for pain to his left ring finger.  Last night he accidentally crushed his finger between 260 pound dumbbells.  Since that time he has had constant pain, is described as throbbing, currently rated 7/10.  He has not yet tried anything for the pain.  He is right-hand dominant.    He denies any other questions or concerns at this time.    Current problem list, medication, and past medical/surgical history were reviewed in Epic.    ROS  See HPI     Objective:      /82   Pulse 100   Temp 37.1 °C (98.8 °F) (Temporal)   Resp 16   Ht 1.715 m (5' 7.5\")   Wt 88.1 kg (194 lb 3.2 oz)   SpO2 95%   BMI 29.97 kg/m²     Physical Exam  Constitutional:       General: He is not in acute distress.     Appearance: He is not diaphoretic.   Cardiovascular:      Rate and Rhythm: Normal rate and regular rhythm.      Heart sounds: Normal heart sounds.   Pulmonary:      Effort: Pulmonary effort is normal. No respiratory distress.      Breath sounds: Normal breath sounds.   Musculoskeletal:      Comments: Subungual hematoma noted to left ring finger.  Patient is still able to flex and extend his left, ring finger.  He is tender to palpation of the distal end of the finger.   Neurological:      Mental Status: He is alert.   Psychiatric:         Mood and Affect: Affect normal.         Judgment: Judgment normal.     Assessment/Plan:     1. Closed fracture of tuft of distal phalanx of finger  2. Pain of finger of left hand  XRAY showing fracture of the tuft of the fourth digit.  Patient was placed in a splint.  Tylenol and ibuprofen as needed for symptomatic relief.  - DX-FINGER(S) 2+ LEFT; Future      Instructed to return to Urgent Care or nearest Emergency Department if symptoms fail to improve, for any change in condition, further concerns, or new concerning symptoms. Patient states understanding of the plan of care and discharge instructions.    Gardenia Gonzalez M.D.   " · Sent to ED from group home due to dysarthria, encephalopathy, lethargy  · Episode of agitation on day of admission in CT scanner, required restraints which have subsequently been removed  · UDS negative  · Neurology consulted:  · CT head and CTA head/neck without acute abnormality  · Previous recent EEG normal  · Low clinical concern for seizure or stroke, no further inpatient neurologic work-up unless change in mental status or no return to baseline  · B12, TSH, ETOH WNL  · No evidence of infection, VSS  · Psychiatry consulted due to concern for polypharmacy given prior regimen of 8 high-dose psychiatric medications  · Lithium level rechecked, normal  · Medications adjusted as follows:  · Clozapine 200mg - discontinued  · Fluvoxamine 300mg - discontinued  · Celexa 20mg - discontinued  · Cogentin 1mg - reduced to 0.5mg  · Doxepin 100mg - reduced to 50mg  · Zyprexa 20mg BID - continue at current dose  · Cardura 1mg - continue at current dose  · Patient appears to have returned to baseline, encephalopathy resolved. Patient stable from psychiatric standpoint, no behavioral outbursts or agitation. Cleared for discharge today 7/11 to return to prior facility. Continue current regimen and follow up with patient's outpatient psychiatrist for further medication adjustment.

## 2023-07-11 NOTE — ASSESSMENT & PLAN NOTE
· Sent to ED from group home due to dysarthria, encephalopathy, lethargy  · Episode of agitation on day of admission in CT scanner, required restraints which have subsequently been removed  · UDS negative  · Neurology consulted:  · CT head and CTA head/neck without acute abnormality  · Previous recent EEG normal  · Low clinical concern for seizure or stroke, no further inpatient neurologic work-up unless change in mental status or no return to baseline  · B12, TSH, ETOH WNL  · No evidence of infection, VSS  · Psychiatry consulted due to concern for polypharmacy given prior regimen of 8 high-dose psychiatric medications  · Lithium level rechecked, normal  · Medications adjusted as follows:  · Clozapine 200mg - discontinued  · Fluvoxamine 300mg - discontinued  · Celexa 20mg - discontinued  · Cogentin 1mg - reduced to 0.5mg  · Doxepin 100mg - reduced to 50mg  · Zyprexa 20mg BID - continue at current dose  · Cardura 1mg - continue at current dose  · Patient appears to have returned to baseline, encephalopathy resolved. Patient stable from psychiatric standpoint, no behavioral outbursts or agitation. Cleared for discharge today 7/11 to return to prior facility. Continue current regimen and follow up with patient's outpatient psychiatrist for further medication adjustment.

## 2023-07-11 NOTE — ASSESSMENT & PLAN NOTE
· Remote h/o seizure in 1971  · [facility] after episode of unresponsiveness and questionable post-ictal confusion at home   · EEG in 2019 normal, Neuro felt there was no concern for neurologic etiology  · Event was felt to be related to underlying psychiatric disorder vs psychiatric medication effect

## 2023-07-11 NOTE — CASE MANAGEMENT
Case Management Progress Note    Patient name Tc Li  [facility] [address] [id number] MRN [id number]  : 1955 Date 2023       LOS (days): 5  Geometric Mean LOS (GMLOS) (days): 2.10  Days to GMLOS:-2.7        OBJECTIVE:    Current admission status: Inpatient  Preferred Pharmacy:   PATIENT/FAMILY REPORTS NO PREFERRED PHARMACY  No address on file      Primary Care Provider: No primary care provider on file. Primary Insurance: MEDICARE  Secondary Insurance: [facility]    PROGRESS NOTE:  Call placed Oscar Syed, Pt's case management for [employer]. Oscar Syed confirmed pickup time of 2pm. Pt's nurse(Juan José) and [facility] provider aware of transport time.

## 2023-07-11 NOTE — ASSESSMENT & PLAN NOTE
· Extensive review of med rec performed on admission  · Pt previously maintained on IM Abilify Maintena ER every month, citalopram daily, clozapine hs, fluvoxaminehs, doxepin hs, lorazepam hs, lithium hs, olanzapine BID, Cogentin daily   · Also on Cogentin prn EPS  · Appreciate psych consult, likely overmedication and polypharmacy contributing to presentation  · Discussed with pharmacy and psych, discontinued clozapine and fluvoxamine. Decreased doxepin and Cogentin to reduce anticholinergic effects.    · Repeat lithium levels WNL  · See above plan

## 2023-07-11 NOTE — DISCHARGE INSTR - AVS FIRST PAGE
Please follow-up with outpatient psychiatry within 1 to 2 weeks for further adjustment to medication regimen  Please follow-up with PCP within 1 week    Medication changes to continue on discharge regarding psychiatric regimen:  Clozapine 200mg - discontinued  Fluvoxamine 300mg - discontinued  Celexa 20mg - discontinued  Cogentin 1mg daily- reduced to 0.5mg  Doxepin 100mg daily- reduced to 50mg  Zyprexa 20mg BID - continue at current dose  Cardura 1mg daily- continue at current dose  Lamictal 900 mg at bedtime- continue at current dose    Please continue all additional previously prescribed home medications

## 2023-12-06 ENCOUNTER — NEW PATIENT (OUTPATIENT)
Dept: URBAN - METROPOLITAN AREA CLINIC 79 | Facility: CLINIC | Age: 68
End: 2023-12-06

## 2023-12-06 DIAGNOSIS — H54.8: ICD-10-CM

## 2023-12-06 DIAGNOSIS — H25.13: ICD-10-CM

## 2023-12-06 DIAGNOSIS — H44.513: ICD-10-CM

## 2023-12-06 DIAGNOSIS — H35.63: ICD-10-CM

## 2023-12-06 PROCEDURE — 99204 OFFICE O/P NEW MOD 45 MIN: CPT

## 2023-12-06 ASSESSMENT — TONOMETRY
OD_IOP_MMHG: 45
OS_IOP_MMHG: 45

## 2024-01-31 ENCOUNTER — HOSPITAL ENCOUNTER (INPATIENT)
Dept: HOSPITAL 99 - EICU | Age: 69
LOS: 5 days | Discharge: HOSPICE | DRG: 682 | End: 2024-02-05
Payer: MEDICARE

## 2024-01-31 VITALS — RESPIRATION RATE: 16 BRPM

## 2024-01-31 VITALS — SYSTOLIC BLOOD PRESSURE: 93 MMHG | DIASTOLIC BLOOD PRESSURE: 79 MMHG

## 2024-01-31 VITALS — DIASTOLIC BLOOD PRESSURE: 62 MMHG | SYSTOLIC BLOOD PRESSURE: 84 MMHG

## 2024-01-31 VITALS — SYSTOLIC BLOOD PRESSURE: 96 MMHG | DIASTOLIC BLOOD PRESSURE: 67 MMHG

## 2024-01-31 VITALS — DIASTOLIC BLOOD PRESSURE: 62 MMHG | SYSTOLIC BLOOD PRESSURE: 91 MMHG

## 2024-01-31 VITALS — SYSTOLIC BLOOD PRESSURE: 77 MMHG | DIASTOLIC BLOOD PRESSURE: 52 MMHG

## 2024-01-31 VITALS — SYSTOLIC BLOOD PRESSURE: 108 MMHG | DIASTOLIC BLOOD PRESSURE: 71 MMHG

## 2024-01-31 VITALS — SYSTOLIC BLOOD PRESSURE: 97 MMHG | DIASTOLIC BLOOD PRESSURE: 70 MMHG

## 2024-01-31 VITALS — DIASTOLIC BLOOD PRESSURE: 65 MMHG | SYSTOLIC BLOOD PRESSURE: 103 MMHG | RESPIRATION RATE: 18 BRPM

## 2024-01-31 VITALS — DIASTOLIC BLOOD PRESSURE: 66 MMHG | SYSTOLIC BLOOD PRESSURE: 95 MMHG

## 2024-01-31 VITALS — DIASTOLIC BLOOD PRESSURE: 51 MMHG | SYSTOLIC BLOOD PRESSURE: 101 MMHG

## 2024-01-31 VITALS — DIASTOLIC BLOOD PRESSURE: 64 MMHG | SYSTOLIC BLOOD PRESSURE: 92 MMHG

## 2024-01-31 VITALS
BODY MASS INDEX: 19.9 KG/M2 | BODY MASS INDEX: 20.1 KG/M2 | BODY MASS INDEX: 20.2 KG/M2 | BODY MASS INDEX: 20 KG/M2 | BODY MASS INDEX: 19.3 KG/M2

## 2024-01-31 VITALS — RESPIRATION RATE: 16 BRPM | SYSTOLIC BLOOD PRESSURE: 75 MMHG | DIASTOLIC BLOOD PRESSURE: 52 MMHG

## 2024-01-31 VITALS — DIASTOLIC BLOOD PRESSURE: 56 MMHG | SYSTOLIC BLOOD PRESSURE: 86 MMHG | OXYGEN SATURATION: 98 % | HEART RATE: 110 BPM

## 2024-01-31 VITALS — SYSTOLIC BLOOD PRESSURE: 90 MMHG | DIASTOLIC BLOOD PRESSURE: 62 MMHG

## 2024-01-31 VITALS — DIASTOLIC BLOOD PRESSURE: 79 MMHG | SYSTOLIC BLOOD PRESSURE: 94 MMHG

## 2024-01-31 VITALS — DIASTOLIC BLOOD PRESSURE: 71 MMHG | SYSTOLIC BLOOD PRESSURE: 97 MMHG

## 2024-01-31 VITALS — DIASTOLIC BLOOD PRESSURE: 57 MMHG | SYSTOLIC BLOOD PRESSURE: 79 MMHG

## 2024-01-31 VITALS — SYSTOLIC BLOOD PRESSURE: 106 MMHG | RESPIRATION RATE: 17 BRPM | DIASTOLIC BLOOD PRESSURE: 65 MMHG

## 2024-01-31 VITALS — RESPIRATION RATE: 18 BRPM | SYSTOLIC BLOOD PRESSURE: 102 MMHG | DIASTOLIC BLOOD PRESSURE: 68 MMHG

## 2024-01-31 VITALS — SYSTOLIC BLOOD PRESSURE: 94 MMHG | DIASTOLIC BLOOD PRESSURE: 72 MMHG

## 2024-01-31 VITALS — SYSTOLIC BLOOD PRESSURE: 87 MMHG | DIASTOLIC BLOOD PRESSURE: 66 MMHG

## 2024-01-31 VITALS — SYSTOLIC BLOOD PRESSURE: 70 MMHG | DIASTOLIC BLOOD PRESSURE: 55 MMHG

## 2024-01-31 VITALS — DIASTOLIC BLOOD PRESSURE: 68 MMHG | SYSTOLIC BLOOD PRESSURE: 96 MMHG

## 2024-01-31 VITALS — SYSTOLIC BLOOD PRESSURE: 94 MMHG | DIASTOLIC BLOOD PRESSURE: 58 MMHG

## 2024-01-31 VITALS — SYSTOLIC BLOOD PRESSURE: 95 MMHG | DIASTOLIC BLOOD PRESSURE: 62 MMHG

## 2024-01-31 VITALS — SYSTOLIC BLOOD PRESSURE: 97 MMHG | DIASTOLIC BLOOD PRESSURE: 66 MMHG

## 2024-01-31 VITALS — DIASTOLIC BLOOD PRESSURE: 67 MMHG | SYSTOLIC BLOOD PRESSURE: 93 MMHG

## 2024-01-31 VITALS — DIASTOLIC BLOOD PRESSURE: 63 MMHG | SYSTOLIC BLOOD PRESSURE: 90 MMHG

## 2024-01-31 VITALS — SYSTOLIC BLOOD PRESSURE: 75 MMHG | DIASTOLIC BLOOD PRESSURE: 52 MMHG

## 2024-01-31 DIAGNOSIS — Z66: ICD-10-CM

## 2024-01-31 DIAGNOSIS — Z88.6: ICD-10-CM

## 2024-01-31 DIAGNOSIS — Z51.5: ICD-10-CM

## 2024-01-31 DIAGNOSIS — H54.7: ICD-10-CM

## 2024-01-31 DIAGNOSIS — I5A: ICD-10-CM

## 2024-01-31 DIAGNOSIS — F25.9: ICD-10-CM

## 2024-01-31 DIAGNOSIS — Z88.8: ICD-10-CM

## 2024-01-31 DIAGNOSIS — E87.6: ICD-10-CM

## 2024-01-31 DIAGNOSIS — E87.20: ICD-10-CM

## 2024-01-31 DIAGNOSIS — E83.51: ICD-10-CM

## 2024-01-31 DIAGNOSIS — Z87.891: ICD-10-CM

## 2024-01-31 DIAGNOSIS — G93.40: ICD-10-CM

## 2024-01-31 DIAGNOSIS — Z91.81: ICD-10-CM

## 2024-01-31 DIAGNOSIS — R33.8: ICD-10-CM

## 2024-01-31 DIAGNOSIS — I10: ICD-10-CM

## 2024-01-31 DIAGNOSIS — R65.10: ICD-10-CM

## 2024-01-31 DIAGNOSIS — E88.09: ICD-10-CM

## 2024-01-31 DIAGNOSIS — R65.11: ICD-10-CM

## 2024-01-31 DIAGNOSIS — K21.9: ICD-10-CM

## 2024-01-31 DIAGNOSIS — Z88.0: ICD-10-CM

## 2024-01-31 DIAGNOSIS — I95.9: ICD-10-CM

## 2024-01-31 DIAGNOSIS — G47.00: ICD-10-CM

## 2024-01-31 DIAGNOSIS — E78.00: ICD-10-CM

## 2024-01-31 DIAGNOSIS — N17.9: Primary | ICD-10-CM

## 2024-01-31 DIAGNOSIS — F31.9: ICD-10-CM

## 2024-01-31 DIAGNOSIS — R55: ICD-10-CM

## 2024-01-31 DIAGNOSIS — F03.918: ICD-10-CM

## 2024-01-31 DIAGNOSIS — N40.1: ICD-10-CM

## 2024-01-31 DIAGNOSIS — Z11.52: ICD-10-CM

## 2024-01-31 LAB
ABSOLUTE NEUTROPHILS -MAN DIFF: 7.6 10^3/UL (ref 1.4–6.5)
ALBUMIN SERPL-MCNC: 2.2 G/DL (ref 3.5–5)
ALP SERPL-CCNC: 64 U/L (ref 38–126)
ALT SERPL-CCNC: 61 U/L (ref 0–50)
AST SERPL-CCNC: 129 U/L (ref 17–59)
BUN SERPL-MCNC: 67 MG/DL (ref 9–20)
BUN SERPL-MCNC: 91 MG/DL (ref 9–20)
CALCIUM SERPL-MCNC: 5.8 MG/DL (ref 8.4–10.2)
CALCIUM SERPL-MCNC: 8 MG/DL (ref 8.4–10.2)
CHLORIDE SERPL-SCNC: 100 MMOL/L (ref 98–107)
CHLORIDE SERPL-SCNC: 107 MMOL/L (ref 98–107)
CO2 SERPL-SCNC: 17 MMOL/L (ref 22–30)
CO2 SERPL-SCNC: 20 MMOL/L (ref 22–30)
DACRYOCYTES BLD QL SMEAR: (no result)
EGFR: 10.94
EGFR: 7.1
ERYTHROCYTE [DISTWIDTH] IN BLOOD BY AUTOMATED COUNT: 15.6 % (ref 11.5–14.5)
ESTIMATED CREATININE CLEARANCE: 13 ML/MIN
ESTIMATED CREATININE CLEARANCE: 9 ML/MIN
GLUCOSE SERPL-MCNC: 111 MG/DL (ref 70–99)
GLUCOSE SERPL-MCNC: 124 MG/DL (ref 70–99)
HCT VFR BLD AUTO: 44 % (ref 39–52)
HGB BLD-MCNC: 15 G/DL (ref 13–18)
MAGNESIUM SERPL-MCNC: 1.8 MG/DL (ref 1.6–2.3)
MCHC RBC AUTO-ENTMCNC: 34.1 G/DL (ref 33–37)
MCV RBC AUTO: 91.1 FL (ref 80–94)
NRBC BLD AUTO-RTO: 0.3 %
PLATELET # BLD AUTO: 311 10^3/UL (ref 130–400)
PLATELETS CHECKED: YES
POTASSIUM SERPL-SCNC: 2.8 MMOL/L (ref 3.5–5.1)
POTASSIUM SERPL-SCNC: 6 MMOL/L (ref 3.5–5.1)
PROT SERPL-MCNC: 5 G/DL (ref 6.3–8.2)
SODIUM SERPL-SCNC: 139 MMOL/L (ref 135–145)
SODIUM SERPL-SCNC: 144 MMOL/L (ref 135–145)
SQUAMOUS URNS QL MICRO: (no result) /LPF
SQUAMOUS URNS QL MICRO: (no result) /LPF
TOTAL CELLS COUNTED BLD: 100
TRANS CELLS UR QL COMP ASSIST: (no result) /LPF
TROPONIN I SERPL-MCNC: 0.26 NG/ML
URINE RED BLOOD CELL: (no result) /HPF (ref 0–2)
URINE RED BLOOD CELL: (no result) /HPF (ref 0–2)
URINE WHITE CELL: (no result) /HPF (ref 0–5)
URINE WHITE CELL: (no result) /HPF (ref 0–5)

## 2024-01-31 RX ADMIN — ROSUVASTATIN CALCIUM 10 MG: 10 TABLET, FILM COATED ORAL at 21:55

## 2024-01-31 RX ADMIN — SODIUM BICARBONATE 270 MEQ: 84 INJECTION, SOLUTION INTRAVENOUS at 12:46

## 2024-01-31 RX ADMIN — HEPARIN SODIUM 5000 UNITS: 5000 INJECTION, SOLUTION INTRAVENOUS; SUBCUTANEOUS at 21:50

## 2024-01-31 RX ADMIN — POTASSIUM CHLORIDE 20 MEQ: 1500 TABLET, EXTENDED RELEASE ORAL at 11:08

## 2024-01-31 RX ADMIN — CEFTRIAXONE SODIUM 2000 MG: 2 INJECTION, POWDER, FOR SOLUTION INTRAMUSCULAR; INTRAVENOUS at 11:08

## 2024-01-31 RX ADMIN — CALCIUM GLUCONATE 100: 10 INJECTION, SOLUTION INTRAVENOUS at 11:08

## 2024-01-31 RX ADMIN — SODIUM CHLORIDE 1000: 900 INJECTION, SOLUTION INTRAVENOUS at 17:01

## 2024-01-31 RX ADMIN — SODIUM CHLORIDE 1500: 900 INJECTION, SOLUTION INTRAVENOUS at 10:24

## 2024-01-31 RX ADMIN — ONDANSETRON HYDROCHLORIDE 4 MG: 2 SOLUTION INTRAMUSCULAR; INTRAVENOUS at 21:50

## 2024-01-31 RX ADMIN — FLUVOXAMINE MALEATE 100 MG: 50 TABLET, COATED ORAL at 21:55

## 2024-02-01 VITALS — DIASTOLIC BLOOD PRESSURE: 64 MMHG | RESPIRATION RATE: 20 BRPM | SYSTOLIC BLOOD PRESSURE: 102 MMHG

## 2024-02-01 VITALS — DIASTOLIC BLOOD PRESSURE: 69 MMHG | SYSTOLIC BLOOD PRESSURE: 108 MMHG | RESPIRATION RATE: 18 BRPM

## 2024-02-01 VITALS — SYSTOLIC BLOOD PRESSURE: 104 MMHG | DIASTOLIC BLOOD PRESSURE: 70 MMHG | RESPIRATION RATE: 17 BRPM

## 2024-02-01 VITALS — SYSTOLIC BLOOD PRESSURE: 105 MMHG | RESPIRATION RATE: 18 BRPM | DIASTOLIC BLOOD PRESSURE: 67 MMHG

## 2024-02-01 VITALS — RESPIRATION RATE: 19 BRPM | DIASTOLIC BLOOD PRESSURE: 69 MMHG | SYSTOLIC BLOOD PRESSURE: 113 MMHG

## 2024-02-01 VITALS — DIASTOLIC BLOOD PRESSURE: 76 MMHG | RESPIRATION RATE: 18 BRPM | SYSTOLIC BLOOD PRESSURE: 113 MMHG

## 2024-02-01 LAB
25(OH)D3 SERPL-MCNC: < 12.8 NG/ML (ref 30–80)
ALBUMIN SERPL-MCNC: 3.1 G/DL (ref 3.5–5)
ALP SERPL-CCNC: 114 U/L (ref 38–126)
ALT SERPL-CCNC: 289 U/L (ref 0–50)
AST SERPL-CCNC: 768 U/L (ref 17–59)
BUN SERPL-MCNC: 105 MG/DL (ref 9–20)
BUN SERPL-MCNC: 114 MG/DL (ref 9–20)
CALCIUM SERPL-MCNC: 7.5 MG/DL (ref 8.4–10.2)
CALCIUM SERPL-MCNC: 7.6 MG/DL (ref 8.4–10.2)
CHLORIDE SERPL-SCNC: 100 MMOL/L (ref 98–107)
CHLORIDE SERPL-SCNC: 98 MMOL/L (ref 98–107)
CO2 SERPL-SCNC: 15 MMOL/L (ref 22–30)
CO2 SERPL-SCNC: 18 MMOL/L (ref 22–30)
EGFR: 6.04
EGFR: 6.58
ERYTHROCYTE [DISTWIDTH] IN BLOOD BY AUTOMATED COUNT: 15.2 % (ref 11.5–14.5)
ERYTHROCYTE [DISTWIDTH] IN BLOOD BY AUTOMATED COUNT: 15.8 % (ref 11.5–14.5)
ESTIMATED CREATININE CLEARANCE: 8 ML/MIN
ESTIMATED CREATININE CLEARANCE: 8 ML/MIN
GLUCOSE SERPL-MCNC: 85 MG/DL (ref 70–99)
GLUCOSE SERPL-MCNC: 91 MG/DL (ref 70–99)
HCT VFR BLD AUTO: 36.5 % (ref 39–52)
HCT VFR BLD AUTO: 37.9 % (ref 39–52)
HGB BLD-MCNC: 12.9 G/DL (ref 13–18)
HGB BLD-MCNC: 13.6 G/DL (ref 13–18)
MAGNESIUM SERPL-MCNC: 2.3 MG/DL (ref 1.6–2.3)
MCHC RBC AUTO-ENTMCNC: 35.3 G/DL (ref 33–37)
MCHC RBC AUTO-ENTMCNC: 35.9 G/DL (ref 33–37)
MCV RBC AUTO: 87.1 FL (ref 80–94)
MCV RBC AUTO: 87.7 FL (ref 80–94)
PLATELET # BLD AUTO: 260 10^3/UL (ref 130–400)
PLATELET # BLD AUTO: 263 10^3/UL (ref 130–400)
POTASSIUM SERPL-SCNC: 5.2 MMOL/L (ref 3.5–5.1)
POTASSIUM SERPL-SCNC: 5.4 MMOL/L (ref 3.5–5.1)
PROT SERPL-MCNC: 6.5 G/DL (ref 6.3–8.2)
SODIUM SERPL-SCNC: 137 MMOL/L (ref 135–145)
SODIUM SERPL-SCNC: 137 MMOL/L (ref 135–145)

## 2024-02-01 RX ADMIN — TAMSULOSIN HYDROCHLORIDE: 0.4 CAPSULE ORAL at 06:36

## 2024-02-01 RX ADMIN — TAMSULOSIN HYDROCHLORIDE 0.4 MG: 0.4 CAPSULE ORAL at 12:55

## 2024-02-01 RX ADMIN — CALCIUM 500 MG: 500 TABLET ORAL at 12:54

## 2024-02-01 RX ADMIN — TAMSULOSIN HYDROCHLORIDE 0.4 MG: 0.4 CAPSULE ORAL at 05:31

## 2024-02-01 RX ADMIN — ACETAMINOPHEN 650 MG: 325 TABLET ORAL at 12:48

## 2024-02-01 RX ADMIN — CALCIUM 500 MG: 500 TABLET ORAL at 20:09

## 2024-02-01 RX ADMIN — SODIUM BICARBONATE 1150 MEQ: 84 INJECTION, SOLUTION INTRAVENOUS at 17:23

## 2024-02-01 RX ADMIN — ERGOCALCIFEROL 50000 UNITS: 1.25 CAPSULE, LIQUID FILLED ORAL at 13:05

## 2024-02-01 RX ADMIN — MAGNESIUM HYDROXIDE 30 ML: 1200 LIQUID ORAL at 05:31

## 2024-02-01 RX ADMIN — FLUVOXAMINE MALEATE 100 MG: 50 TABLET, COATED ORAL at 21:08

## 2024-02-01 RX ADMIN — SODIUM CHLORIDE 1000: 900 INJECTION, SOLUTION INTRAVENOUS at 02:36

## 2024-02-01 RX ADMIN — CEFTRIAXONE 1000 MG: 1 INJECTION, POWDER, FOR SOLUTION INTRAMUSCULAR; INTRAVENOUS at 12:47

## 2024-02-01 RX ADMIN — ARIPIPRAZOLE 5 MG: 5 TABLET ORAL at 12:54

## 2024-02-01 RX ADMIN — HEPARIN SODIUM 5000 UNITS: 5000 INJECTION, SOLUTION INTRAVENOUS; SUBCUTANEOUS at 12:48

## 2024-02-01 RX ADMIN — SODIUM BICARBONATE 1075 MEQ: 84 INJECTION, SOLUTION INTRAVENOUS at 12:45

## 2024-02-01 RX ADMIN — SODIUM ZIRCONIUM CYCLOSILICATE 10 GRAM: 10 POWDER, FOR SUSPENSION ORAL at 12:53

## 2024-02-01 RX ADMIN — WATER 10 ML: 1 INJECTION INTRAMUSCULAR; INTRAVENOUS; SUBCUTANEOUS at 12:47

## 2024-02-01 RX ADMIN — ROSUVASTATIN CALCIUM 10 MG: 10 TABLET, FILM COATED ORAL at 21:06

## 2024-02-01 RX ADMIN — PANTOPRAZOLE SODIUM 40 MG: 40 TABLET, DELAYED RELEASE ORAL at 12:54

## 2024-02-01 RX ADMIN — HEPARIN SODIUM 5000 UNITS: 5000 INJECTION, SOLUTION INTRAVENOUS; SUBCUTANEOUS at 20:08

## 2024-02-02 VITALS — DIASTOLIC BLOOD PRESSURE: 71 MMHG | OXYGEN SATURATION: 95 % | SYSTOLIC BLOOD PRESSURE: 117 MMHG | HEART RATE: 103 BPM

## 2024-02-02 VITALS — RESPIRATION RATE: 16 BRPM | DIASTOLIC BLOOD PRESSURE: 74 MMHG | SYSTOLIC BLOOD PRESSURE: 111 MMHG

## 2024-02-02 VITALS — SYSTOLIC BLOOD PRESSURE: 122 MMHG | RESPIRATION RATE: 18 BRPM | DIASTOLIC BLOOD PRESSURE: 65 MMHG

## 2024-02-02 VITALS — DIASTOLIC BLOOD PRESSURE: 68 MMHG | SYSTOLIC BLOOD PRESSURE: 124 MMHG | RESPIRATION RATE: 17 BRPM

## 2024-02-02 VITALS — RESPIRATION RATE: 18 BRPM | DIASTOLIC BLOOD PRESSURE: 77 MMHG | SYSTOLIC BLOOD PRESSURE: 121 MMHG

## 2024-02-02 VITALS — DIASTOLIC BLOOD PRESSURE: 74 MMHG | SYSTOLIC BLOOD PRESSURE: 127 MMHG | RESPIRATION RATE: 18 BRPM

## 2024-02-02 LAB
BUN SERPL-MCNC: 131 MG/DL (ref 9–20)
CALCIUM SERPL-MCNC: 7.1 MG/DL (ref 8.4–10.2)
CHLORIDE SERPL-SCNC: 92 MMOL/L (ref 98–107)
CO2 SERPL-SCNC: 20 MMOL/L (ref 22–30)
EGFR: 5.5
ERYTHROCYTE [DISTWIDTH] IN BLOOD BY AUTOMATED COUNT: 15.3 % (ref 11.5–14.5)
ESTIMATED CREATININE CLEARANCE: 7 ML/MIN
GLUCOSE SERPL-MCNC: 74 MG/DL (ref 70–99)
HCT VFR BLD AUTO: 37.5 % (ref 39–52)
HGB BLD-MCNC: 13 G/DL (ref 13–18)
MAGNESIUM SERPL-MCNC: 2.4 MG/DL (ref 1.6–2.3)
MCHC RBC AUTO-ENTMCNC: 34.7 G/DL (ref 33–37)
MCV RBC AUTO: 87.4 FL (ref 80–94)
PLATELET # BLD AUTO: 219 10^3/UL (ref 130–400)
POTASSIUM SERPL-SCNC: 4.6 MMOL/L (ref 3.5–5.1)
SODIUM SERPL-SCNC: 138 MMOL/L (ref 135–145)

## 2024-02-02 RX ADMIN — TAMSULOSIN HYDROCHLORIDE 0.4 MG: 0.4 CAPSULE ORAL at 07:48

## 2024-02-02 RX ADMIN — HEPARIN SODIUM 5000 UNITS: 5000 INJECTION, SOLUTION INTRAVENOUS; SUBCUTANEOUS at 07:48

## 2024-02-02 RX ADMIN — FLUVOXAMINE MALEATE 100 MG: 50 TABLET, COATED ORAL at 20:30

## 2024-02-02 RX ADMIN — PANTOPRAZOLE SODIUM 40 MG: 40 TABLET, DELAYED RELEASE ORAL at 07:48

## 2024-02-02 RX ADMIN — CEFTRIAXONE 1000 MG: 1 INJECTION, POWDER, FOR SOLUTION INTRAMUSCULAR; INTRAVENOUS at 11:41

## 2024-02-02 RX ADMIN — HEPARIN SODIUM 5000 UNITS: 5000 INJECTION, SOLUTION INTRAVENOUS; SUBCUTANEOUS at 20:30

## 2024-02-02 RX ADMIN — WATER 10 ML: 1 INJECTION INTRAMUSCULAR; INTRAVENOUS; SUBCUTANEOUS at 11:41

## 2024-02-02 RX ADMIN — SODIUM BICARBONATE 1150 MEQ: 84 INJECTION, SOLUTION INTRAVENOUS at 11:41

## 2024-02-02 RX ADMIN — CALCIUM 500 MG: 500 TABLET ORAL at 07:48

## 2024-02-02 RX ADMIN — CALCIUM 500 MG: 500 TABLET ORAL at 20:30

## 2024-02-02 RX ADMIN — ROSUVASTATIN CALCIUM 10 MG: 10 TABLET, FILM COATED ORAL at 20:30

## 2024-02-02 RX ADMIN — ARIPIPRAZOLE 5 MG: 5 TABLET ORAL at 07:48

## 2024-02-03 VITALS — RESPIRATION RATE: 18 BRPM | SYSTOLIC BLOOD PRESSURE: 94 MMHG | DIASTOLIC BLOOD PRESSURE: 60 MMHG

## 2024-02-03 VITALS — SYSTOLIC BLOOD PRESSURE: 105 MMHG | RESPIRATION RATE: 16 BRPM | DIASTOLIC BLOOD PRESSURE: 69 MMHG

## 2024-02-03 VITALS — RESPIRATION RATE: 18 BRPM | DIASTOLIC BLOOD PRESSURE: 73 MMHG | SYSTOLIC BLOOD PRESSURE: 102 MMHG

## 2024-02-03 VITALS — RESPIRATION RATE: 18 BRPM | DIASTOLIC BLOOD PRESSURE: 59 MMHG | SYSTOLIC BLOOD PRESSURE: 98 MMHG

## 2024-02-03 VITALS — RESPIRATION RATE: 20 BRPM | DIASTOLIC BLOOD PRESSURE: 62 MMHG | SYSTOLIC BLOOD PRESSURE: 95 MMHG

## 2024-02-03 VITALS — RESPIRATION RATE: 15 BRPM | DIASTOLIC BLOOD PRESSURE: 61 MMHG | SYSTOLIC BLOOD PRESSURE: 99 MMHG

## 2024-02-03 LAB
BUN SERPL-MCNC: 140 MG/DL (ref 9–20)
CALCIUM SERPL-MCNC: 6.9 MG/DL (ref 8.4–10.2)
CHLORIDE SERPL-SCNC: 92 MMOL/L (ref 98–107)
CO2 SERPL-SCNC: 16 MMOL/L (ref 22–30)
EGFR: 4.99
ERYTHROCYTE [DISTWIDTH] IN BLOOD BY AUTOMATED COUNT: 15.1 % (ref 11.5–14.5)
ESTIMATED CREATININE CLEARANCE: 7 ML/MIN
GLUCOSE - POINT OF CARE: 124 MG/DL (ref 70–99)
GLUCOSE - POINT OF CARE: 45 MG/DL (ref 70–99)
GLUCOSE - POINT OF CARE: 48 MG/DL (ref 70–99)
GLUCOSE - POINT OF CARE: 58 MG/DL (ref 70–99)
GLUCOSE - POINT OF CARE: 65 MG/DL (ref 70–99)
GLUCOSE - POINT OF CARE: 90 MG/DL (ref 70–99)
GLUCOSE - POINT OF CARE: 92 MG/DL (ref 70–99)
GLUCOSE SERPL-MCNC: 40 MG/DL (ref 70–99)
HCT VFR BLD AUTO: 36.2 % (ref 39–52)
HGB BLD-MCNC: 13 G/DL (ref 13–18)
MAGNESIUM SERPL-MCNC: 2.6 MG/DL (ref 1.6–2.3)
MCHC RBC AUTO-ENTMCNC: 35.9 G/DL (ref 33–37)
MCV RBC AUTO: 85.4 FL (ref 80–94)
PLATELET # BLD AUTO: 215 10^3/UL (ref 130–400)
POTASSIUM SERPL-SCNC: 4.8 MMOL/L (ref 3.5–5.1)
SODIUM SERPL-SCNC: 134 MMOL/L (ref 135–145)

## 2024-02-03 RX ADMIN — ROSUVASTATIN CALCIUM 10 MG: 10 TABLET, FILM COATED ORAL at 20:38

## 2024-02-03 RX ADMIN — HEPARIN SODIUM 5000 UNITS: 5000 INJECTION, SOLUTION INTRAVENOUS; SUBCUTANEOUS at 08:22

## 2024-02-03 RX ADMIN — FLUVOXAMINE MALEATE 100 MG: 50 TABLET, COATED ORAL at 20:38

## 2024-02-03 RX ADMIN — TAMSULOSIN HYDROCHLORIDE 0.4 MG: 0.4 CAPSULE ORAL at 08:21

## 2024-02-03 RX ADMIN — CALCIUM 500 MG: 500 TABLET ORAL at 08:21

## 2024-02-03 RX ADMIN — CEFTRIAXONE: 1 INJECTION, POWDER, FOR SOLUTION INTRAMUSCULAR; INTRAVENOUS at 12:17

## 2024-02-03 RX ADMIN — WATER: 1 INJECTION INTRAMUSCULAR; INTRAVENOUS; SUBCUTANEOUS at 12:17

## 2024-02-03 RX ADMIN — HEPARIN SODIUM 5000 UNITS: 5000 INJECTION, SOLUTION INTRAVENOUS; SUBCUTANEOUS at 20:38

## 2024-02-03 RX ADMIN — CALCIUM 500 MG: 500 TABLET ORAL at 20:38

## 2024-02-03 RX ADMIN — PANTOPRAZOLE SODIUM 40 MG: 40 TABLET, DELAYED RELEASE ORAL at 08:21

## 2024-02-03 RX ADMIN — ARIPIPRAZOLE 5 MG: 5 TABLET ORAL at 08:22

## 2024-02-03 RX ADMIN — DEXTROSE MONOHYDRATE 12.5 GRAMS: 25 INJECTION, SOLUTION INTRAVENOUS at 10:46

## 2024-02-04 VITALS — RESPIRATION RATE: 20 BRPM | DIASTOLIC BLOOD PRESSURE: 74 MMHG | SYSTOLIC BLOOD PRESSURE: 113 MMHG

## 2024-02-04 VITALS — DIASTOLIC BLOOD PRESSURE: 63 MMHG | SYSTOLIC BLOOD PRESSURE: 93 MMHG | RESPIRATION RATE: 20 BRPM

## 2024-02-04 VITALS — RESPIRATION RATE: 22 BRPM | DIASTOLIC BLOOD PRESSURE: 58 MMHG | SYSTOLIC BLOOD PRESSURE: 90 MMHG

## 2024-02-04 LAB
GLUCOSE - POINT OF CARE: 88 MG/DL (ref 70–99)
PROTEINASE3 AB SER-ACNC: 8 AU/ML (ref 0–19)

## 2024-02-04 RX ADMIN — PANTOPRAZOLE SODIUM 40 MG: 40 TABLET, DELAYED RELEASE ORAL at 07:32

## 2024-02-04 RX ADMIN — MORPHINE SULFATE 1 MG: 2 INJECTION, SOLUTION INTRAMUSCULAR; INTRAVENOUS at 21:55

## 2024-02-04 RX ADMIN — MORPHINE SULFATE 1 MG: 2 INJECTION, SOLUTION INTRAMUSCULAR; INTRAVENOUS at 20:04

## 2024-02-04 RX ADMIN — SODIUM CHLORIDE 0.25 ML: 9 INJECTION, SOLUTION INTRAMUSCULAR; INTRAVENOUS; SUBCUTANEOUS at 13:48

## 2024-02-04 RX ADMIN — LORAZEPAM 0.5 MG: 2 INJECTION INTRAMUSCULAR; INTRAVENOUS at 13:49

## 2024-02-04 RX ADMIN — ROSUVASTATIN CALCIUM: 10 TABLET, FILM COATED ORAL at 21:50

## 2024-02-04 RX ADMIN — ARIPIPRAZOLE 5 MG: 5 TABLET ORAL at 07:33

## 2024-02-04 RX ADMIN — TAMSULOSIN HYDROCHLORIDE: 0.4 CAPSULE ORAL at 07:33

## 2024-02-04 RX ADMIN — HEPARIN SODIUM: 5000 INJECTION, SOLUTION INTRAVENOUS; SUBCUTANEOUS at 20:07

## 2024-02-04 RX ADMIN — FLUVOXAMINE MALEATE: 50 TABLET, COATED ORAL at 21:51

## 2024-02-04 RX ADMIN — CALCIUM: 500 TABLET ORAL at 19:25

## 2024-02-04 RX ADMIN — CALCIUM 500 MG: 500 TABLET ORAL at 07:32

## 2024-02-04 RX ADMIN — HEPARIN SODIUM 5000 UNITS: 5000 INJECTION, SOLUTION INTRAVENOUS; SUBCUTANEOUS at 07:33

## 2024-02-05 ENCOUNTER — HOSPITAL ENCOUNTER (INPATIENT)
Dept: HOSPITAL 99 - EICU | Age: 69
DRG: 951 | End: 2024-02-05
Payer: COMMERCIAL

## 2024-02-05 VITALS — DIASTOLIC BLOOD PRESSURE: 43 MMHG | SYSTOLIC BLOOD PRESSURE: 74 MMHG | RESPIRATION RATE: 18 BRPM

## 2024-02-05 DIAGNOSIS — N17.9: ICD-10-CM

## 2024-02-05 DIAGNOSIS — R65.10: ICD-10-CM

## 2024-02-05 DIAGNOSIS — R55: ICD-10-CM

## 2024-02-05 DIAGNOSIS — F25.9: ICD-10-CM

## 2024-02-05 DIAGNOSIS — Z51.5: Primary | ICD-10-CM

## 2024-02-05 DIAGNOSIS — E78.5: ICD-10-CM

## 2024-02-05 DIAGNOSIS — I10: ICD-10-CM

## 2024-02-05 DIAGNOSIS — F31.9: ICD-10-CM

## 2024-02-05 DIAGNOSIS — R34: ICD-10-CM

## 2024-02-05 DIAGNOSIS — F03.93: ICD-10-CM

## 2024-02-05 DIAGNOSIS — R33.8: ICD-10-CM

## 2024-02-05 RX ADMIN — TAMSULOSIN HYDROCHLORIDE: 0.4 CAPSULE ORAL at 08:00

## 2024-02-05 RX ADMIN — HYOSCYAMINE SULFATE 0.12 MG: 0.12 SOLUTION ORAL at 01:15

## 2024-02-05 RX ADMIN — PANTOPRAZOLE SODIUM: 40 TABLET, DELAYED RELEASE ORAL at 08:00

## 2024-02-05 RX ADMIN — HEPARIN SODIUM 5000 UNITS: 5000 INJECTION, SOLUTION INTRAVENOUS; SUBCUTANEOUS at 08:01

## 2024-02-05 RX ADMIN — ARIPIPRAZOLE: 5 TABLET ORAL at 08:00

## 2024-02-05 RX ADMIN — MORPHINE SULFATE 1 MG: 2 INJECTION, SOLUTION INTRAMUSCULAR; INTRAVENOUS at 06:11

## 2024-02-05 RX ADMIN — CALCIUM: 500 TABLET ORAL at 08:00

## 2024-02-05 RX ADMIN — LORAZEPAM 0.5 MG: 2 INJECTION INTRAMUSCULAR; INTRAVENOUS at 13:34

## 2024-02-05 RX ADMIN — MORPHINE SULFATE 1 MG: 2 INJECTION, SOLUTION INTRAMUSCULAR; INTRAVENOUS at 00:35

## 2024-02-05 RX ADMIN — SODIUM CHLORIDE 0.25 ML: 9 INJECTION, SOLUTION INTRAMUSCULAR; INTRAVENOUS; SUBCUTANEOUS at 13:34

## 2024-02-05 RX ADMIN — MORPHINE SULFATE 1 MG: 2 INJECTION, SOLUTION INTRAMUSCULAR; INTRAVENOUS at 13:23
